# Patient Record
Sex: MALE | Race: BLACK OR AFRICAN AMERICAN | NOT HISPANIC OR LATINO | URBAN - METROPOLITAN AREA
[De-identification: names, ages, dates, MRNs, and addresses within clinical notes are randomized per-mention and may not be internally consistent; named-entity substitution may affect disease eponyms.]

---

## 2022-06-15 ENCOUNTER — EMERGENCY (EMERGENCY)
Facility: HOSPITAL | Age: 27
LOS: 1 days | Discharge: HOME | End: 2022-06-15
Admitting: EMERGENCY MEDICINE
Payer: SELF-PAY

## 2022-06-15 VITALS
DIASTOLIC BLOOD PRESSURE: 52 MMHG | SYSTOLIC BLOOD PRESSURE: 135 MMHG | HEART RATE: 82 BPM | TEMPERATURE: 98 F | RESPIRATION RATE: 16 BRPM | OXYGEN SATURATION: 99 % | WEIGHT: 229.28 LBS | HEIGHT: 72 IN

## 2022-06-15 DIAGNOSIS — R19.8 OTHER SPECIFIED SYMPTOMS AND SIGNS INVOLVING THE DIGESTIVE SYSTEM AND ABDOMEN: ICD-10-CM

## 2022-06-15 DIAGNOSIS — R09.81 NASAL CONGESTION: ICD-10-CM

## 2022-06-15 DIAGNOSIS — M79.672 PAIN IN LEFT FOOT: ICD-10-CM

## 2022-06-15 DIAGNOSIS — M79.671 PAIN IN RIGHT FOOT: ICD-10-CM

## 2022-06-15 LAB
FLUAV AG NPH QL: SIGNIFICANT CHANGE UP
FLUBV AG NPH QL: SIGNIFICANT CHANGE UP
RSV RNA NPH QL NAA+NON-PROBE: SIGNIFICANT CHANGE UP
SARS-COV-2 RNA SPEC QL NAA+PROBE: SIGNIFICANT CHANGE UP

## 2022-06-15 PROCEDURE — 99283 EMERGENCY DEPT VISIT LOW MDM: CPT | Mod: 25

## 2022-06-15 PROCEDURE — 87637 SARSCOV2&INF A&B&RSV AMP PRB: CPT

## 2022-06-15 PROCEDURE — 96372 THER/PROPH/DIAG INJ SC/IM: CPT

## 2022-06-15 PROCEDURE — 87491 CHLMYD TRACH DNA AMP PROBE: CPT

## 2022-06-15 PROCEDURE — 87591 N.GONORRHOEAE DNA AMP PROB: CPT

## 2022-06-15 PROCEDURE — 99284 EMERGENCY DEPT VISIT MOD MDM: CPT

## 2022-06-15 RX ORDER — AZITHROMYCIN 500 MG/1
1 TABLET, FILM COATED ORAL ONCE
Refills: 0 | Status: COMPLETED | OUTPATIENT
Start: 2022-06-15 | End: 2022-06-15

## 2022-06-15 RX ORDER — CEFTRIAXONE 500 MG/1
500 INJECTION, POWDER, FOR SOLUTION INTRAMUSCULAR; INTRAVENOUS ONCE
Refills: 0 | Status: COMPLETED | OUTPATIENT
Start: 2022-06-15 | End: 2022-06-15

## 2022-06-15 RX ADMIN — AZITHROMYCIN 1 GRAM(S): 500 TABLET, FILM COATED ORAL at 08:00

## 2022-06-15 RX ADMIN — CEFTRIAXONE 500 MILLIGRAM(S): 500 INJECTION, POWDER, FOR SOLUTION INTRAMUSCULAR; INTRAVENOUS at 08:00

## 2022-06-15 NOTE — ED PROVIDER NOTE - NSFOLLOWUPINSTRUCTIONS_ED_ALL_ED_FT
Follow up with your primary care doctor or clinics listed below if you do not have a doctor,    [address] [address]  To make an appointment, call [phone]    [facility]  Address: [address] [place] [id number]  Appointment Center: [phone] ([phone])     [facility] is a good referral line for crisis and substance abuse help.   has drop in programs all over the [facility].    Return to the ER for Emergencies.  Return immediately for any new or worsening symptoms or any new concerns    A sexually transmitted disease (STD) is a disease or infection that may be passed (transmitted) from person to person, usually during sexual activity. This may happen by way of saliva, semen, blood, vaginal mucus, or urine. Symptoms vary depending on the type of STD acquired and may include pain in the groin, discharge, and lesions or a rash. If you are started on an antibiotic, take it exactly as instructed. Avoid sexual contact of any kind until cleared by a health care professional. Contact your sexual partner(s) to inform them of your diagnosis so that they may be tested and treated as well.    SEEK IMMEDIATE MEDICAL CARE IF YOU HAVE ANY OF THE FOLLOWING SYMPTOMS: severe abdominal pain, high fever, nausea/vomiting, or unintended weight loss.

## 2022-06-15 NOTE — ED PROVIDER NOTE - PATIENT PORTAL LINK FT
You can access the FollowMyHealth Patient Portal offered by [facility] by registering at the following website: [email or web address]. By joining [employer]’s FollowMyHealth portal, you will also be able to view your health information using other applications (apps) compatible with our system.

## 2022-06-15 NOTE — ED PROVIDER NOTE - PHYSICAL EXAMINATION
Vitals reviewed  Gen: comfortable appearing, nad, speaking in full sentences- no hypoxia/dyspnea   Skin: wwp, no rash/lesions  HEENT: ncat, eomi, mmm  CV: rrr, no audible m/r/g  Resp: symmetrical expansion, ctab, no w/r/r  Abd: nondistended, soft/nt, no rebound/guarding, no cvat  GUALBERTO: (chaperone RN Kishor present)- no external mass/lesions, no discharge, no internal mass palpable, no blood or discharge noted   Ext: FROM throughout, no peripheral edema, distal pulses 2+  Neuro: alert/oriented, no focal deficits, steady gait

## 2022-06-15 NOTE — ED ADULT TRIAGE NOTE - CHIEF COMPLAINT QUOTE
Pt presents to ER with multiple complaints including, body aches, foot pain, back pain and rectal "discharge".

## 2022-06-15 NOTE — ED PROVIDER NOTE - CLINICAL SUMMARY MEDICAL DECISION MAKING FREE TEXT BOX
26 M undomiciled p/w mult c/o congestion/feeling run down, rectal discharge and pain in both feet.  on exam pt VSS, comfortable appearing, lungs ctab, hrrr, abd soft/nt, GUALBERTO no mass or lesions, no discharge or blood, ROSARIO.  pt requesting std testing and treatment although denies sexual activity and adamantly denies anal intercourse/MSM.  will obtain covid/flu swab, STD testing and treat w/ rocephin/azithro as concern for poor compliance w/ doxy.  no e/o rectal abscess or dc.  instructed to f/u outpt.  discussed strict return parameters

## 2022-06-15 NOTE — ED ADULT NURSE NOTE - BOWEL SOUNDS LLQ
Problem: At Risk for Falls  Goal: # Patient does not fall  Outcome: Outcome Met, Continue evaluating goal progress toward completion     Problem: At Risk for Injury Due to Fall  Goal: # Patient does not fall  Outcome: Outcome Met, Continue evaluating goal progress toward completion      present

## 2022-06-15 NOTE — ED PROVIDER NOTE - OBJECTIVE STATEMENT
26 M undomiciled p/w mult c/o congestion/feeling run down, rectal discharge and pain in both feet.  pt reports nasal congestion and feeling sick like he is getting a cold, no cough/fevers.  states he is fully vaccinated for covid.  also w/ rectal discharge which he describes as sweat like dc around buttock.  pt reports he is not sexually active but requesting std test/treatment.  adamantly denies anal intercourse.  also c/o b/l feet pain after walking around a lot.  denies f/c, headache, dizziness, fainting, chest pain, sob, abd pain, nvd, melena, hematochezia, urinary sxs, penile dc, testicular pain/swelling, trauma.

## 2022-06-16 LAB
C TRACH RRNA SPEC QL NAA+PROBE: SIGNIFICANT CHANGE UP
N GONORRHOEA RRNA SPEC QL NAA+PROBE: SIGNIFICANT CHANGE UP
SPECIMEN SOURCE: SIGNIFICANT CHANGE UP

## 2022-07-07 ENCOUNTER — EMERGENCY (EMERGENCY)
Facility: HOSPITAL | Age: 27
LOS: 1 days | Discharge: HOME | End: 2022-07-07
Admitting: EMERGENCY MEDICINE
Payer: MEDICAID

## 2022-07-07 VITALS
TEMPERATURE: 98 F | DIASTOLIC BLOOD PRESSURE: 64 MMHG | HEIGHT: 72 IN | HEART RATE: 88 BPM | RESPIRATION RATE: 16 BRPM | SYSTOLIC BLOOD PRESSURE: 118 MMHG | WEIGHT: 240.08 LBS | OXYGEN SATURATION: 97 %

## 2022-07-07 PROCEDURE — 99283 EMERGENCY DEPT VISIT LOW MDM: CPT

## 2022-07-07 RX ORDER — ACETAMINOPHEN 500 MG
975 TABLET ORAL ONCE
Refills: 0 | Status: COMPLETED | OUTPATIENT
Start: 2022-07-07 | End: 2022-07-07

## 2022-07-07 RX ADMIN — Medication 975 MILLIGRAM(S): at 14:07

## 2022-07-07 NOTE — ED PROVIDER NOTE - CLINICAL SUMMARY MEDICAL DECISION MAKING FREE TEXT BOX
25 yo m with no pmh c/o cough, ha, bodyaches x 6 days. Pt tested positive for covid on 7/1. Pt states he is homeless and has no place to go. No fever, chills, cp, sob, n/v/d. VSS. Exam unremarkable. 25 yo m with no pmh c/o cough, ha, bodyaches x 6 days. Pt tested positive for covid on 7/1. Pt states he is homeless and has no place to go. No fever, chills, cp, sob, n/v/d. VSS. Exam unremarkable. Seen by SW, pt past quarantine period so cannot get covid shelter.

## 2022-07-07 NOTE — ED ADULT NURSE NOTE - OBJECTIVE STATEMENT
Pt reports testing covid + on Friday 7/1, symptoms since Thursday. Reports headache,dry cough today, denies fever, chest pain, sob.

## 2022-07-07 NOTE — ED PROVIDER NOTE - PATIENT PORTAL LINK FT
You can access the FollowMyHealth Patient Portal offered by [facility] by registering at the following website: [email or web address]. By joining [employer]’s FollowMyHealth portal, you will also be able to view your health information using other applications (apps) compatible with our system.

## 2022-07-07 NOTE — ED ADULT TRIAGE NOTE - CHIEF COMPLAINT QUOTE
Pt reports testing covid + on Friday 7/1, symptoms since Thursday. Reports headache, cough today, denies fever, chest pain, sob.

## 2022-07-07 NOTE — ED PROVIDER NOTE - NSFOLLOWUPINSTRUCTIONS_ED_ALL_ED_FT
COVID-19    RETURN TO THE EMERGENCY DEPARTMENT FOR DIFFICULTY SPEAKING IN FULL SENTENCES, FEELING SHORT OF BREATH WALKING ROOM TO ROOM AT HOME OR UPSTAIRS, OR OTHER CONCERNING SYMPTOMS.    DO NOT LEAVE HOME. DO NOT TAKE PUBLIC TRANSPORTATION. IF YOU HAVE OTHERS IN YOUR HOME REMAIN 6-10 FEET FROM THEM AND USE THE MASK AT HOME. IF YOU MUST LEAVE THE HOUSE, USE THE MASK.     For isolation and quarantine of the general population, follow the CDC recommendations.   CDC Updated and Shortened Recommended Isolation and Quarantine Period for General Population are as follows:  •Isolate for 5 days, where day 0 is the day of symptom onset or (if asymptomatic) the day of collection of the first positive specimen.  •If asymptomatic at the end of 5 days or if symptoms are resolving, isolation ends and the individual should wear a well-fitting mask while around others for an additional 5 days.  •Individuals who are moderately-severely immunocompromised should continue to follow standard (i.e., not shortened) Isolation Guidance. Individuals who are unable to wear a well-fitting mask for 5 days after a 5-day isolation should also follow standard(i.e., not shortened) Isolation Guidance.    If exposed to COVID-19, quarantine as follows, where day 0 is the last date of exposure:   •If not fully vaccinated or fully vaccinated and eligible for a booster but not yet boosted, quarantine for 5 days and wear a well-fitting mask while around others for an additional 5 days.  •If fully vaccinated and booster (with the booster at least 2 weeks before the first date of exposure) or not yet eligible for a booster, no quarantine is required but these individuals should wear a well-fitting mask while around others for 10 days after the last date of exposure. If possible, test at day 5 with either a nucleic acid amplification test (NAAT, e.g., PCR) or antigen test.   •If symptoms appear, quarantine and seek testing. In this situation, quarantine would end when the test is negative. If testing is not done, isolate according to the guidance above.     Additional guidance from [facility] for schools is expected in coming days. [facility] will review that guidance when it becomes available. For the time being, schools should follow current [facility] school guidance regarding school attendance unless the local health department (LHD) issues alternative guidance. In the event the LHD issues alternative guidance, adhere to the LHD guidance for the school community.    Additional guidance from [facility] for congregate care settings is expected in coming days. [facility] will review that guidance when it becomes available. Many congregate care settings are at high risk for rapid transmission and/or poor outcomes. Congregate care settings include corrections, shelters, childcare, and group homes and other residential care settings not included in healthcare guidance. Congregate healthcare settings should follow [facility] guidance for healthcare personnel return.    Other congregate care settings should follow guidance issued by the D or, if none:   •In the absence of staff shortages, continue to follow standard (i.e., not shortened) guidance for [id number] for infected or exposed personnel for the setting.   •In staff shortage situations, as defined by an inability to provide essential services as determined by the entity, such congregate living settings may allow infected or exposed staff to return to work based on the durations for general population isolation and quarantine above.    Note that work restriction (if an individual works in health care or a congregate care setting as above) or school attendancer equirements (depending on [facility] guidance) for an individual might be different from isolation or quarantine requirements for that individual. Work restrictions pertain to only when the individual may return to work; isolation or quarantine requirements pertain to the individual’s other day-to-day activities in the community    As more information becomes available about appropriate isolation and quarantine durations with the Omicron variant, and as formal CDC guidance becomes available, [facility] will evaluate and update state guidance accordingly.    Check the CDC website (cdc.gov) for updates.    General information for spread of infection:   •Avoid close contact with people who are sick.  •Avoid touching your eyes, nose, and mouth.  •Stay home when you are sick.  •Cover your cough or sneeze with a tissue, then throw the tissue in the trash.  •Clean and disinfect frequently touched objects and surfaces using a regular household cleaning spray or wipe.  •Follow CDC’s recommendations for using a facemask.  •Wash your hands often with soap and water for at least 20 seconds, especially after going to the bathroom; before eating; and after blowing your nose, coughing, or sneezing.  •If soap and water are not readily available, use an alcohol-based hand  with at least 60% alcohol. Always wash hands with soap and water if hands are visibly dirty.

## 2022-07-07 NOTE — ED PROVIDER NOTE - OBJECTIVE STATEMENT
27 yo m with no pmh c/o cough, ha, bodyaches x 6 days. Pt tested positive for covid on 7/1. Pt states he is homeless and has no place to go. No fever, chills, cp, sob, n/v/d.

## 2022-07-07 NOTE — ED ADULT NURSE NOTE - CHPI ED NUR SYMPTOMS NEG
no chest pain/no chills/no cough/no diaphoresis/no edema/no fever/no hemoptysis/no shortness of breath/no wheezing

## 2022-07-09 ENCOUNTER — EMERGENCY (EMERGENCY)
Facility: HOSPITAL | Age: 27
LOS: 1 days | Discharge: HOME | End: 2022-07-09
Admitting: EMERGENCY MEDICINE
Payer: MEDICAID

## 2022-07-09 VITALS
RESPIRATION RATE: 16 BRPM | DIASTOLIC BLOOD PRESSURE: 75 MMHG | SYSTOLIC BLOOD PRESSURE: 113 MMHG | OXYGEN SATURATION: 100 % | TEMPERATURE: 98 F | HEART RATE: 69 BPM

## 2022-07-09 VITALS
DIASTOLIC BLOOD PRESSURE: 68 MMHG | RESPIRATION RATE: 18 BRPM | HEART RATE: 86 BPM | SYSTOLIC BLOOD PRESSURE: 125 MMHG | HEIGHT: 72 IN | TEMPERATURE: 98 F

## 2022-07-09 LAB — SARS-COV-2 RNA SPEC QL NAA+PROBE: POSITIVE

## 2022-07-09 PROCEDURE — 99283 EMERGENCY DEPT VISIT LOW MDM: CPT

## 2022-07-09 PROCEDURE — 99285 EMERGENCY DEPT VISIT HI MDM: CPT

## 2022-07-09 PROCEDURE — 87635 SARS-COV-2 COVID-19 AMP PRB: CPT

## 2022-07-09 RX ORDER — IBUPROFEN 200 MG
800 TABLET ORAL ONCE
Refills: 0 | Status: COMPLETED | OUTPATIENT
Start: 2022-07-09 | End: 2022-07-09

## 2022-07-09 RX ADMIN — Medication 800 MILLIGRAM(S): at 06:08

## 2022-07-09 NOTE — ED ADULT TRIAGE NOTE - ARRIVAL INFO ADDITIONAL COMMENTS
Pt Un-domiciled c/o Headache and "hot flashes". Afebrile. To ED today "Because I still feel sick". Had not taken any medications. Reports positive covid 19 test July 1st. To [facility] 2 days ago for same complaint Pt Un-domiciled c/o Headache and "hot flashes". Afebrile. To ED today "Because I still feel sick". Has not taken any medications. Reports positive covid 19 test July 1st. To [facility] 2 days ago for same complaint

## 2022-07-09 NOTE — ED ADULT NURSE NOTE - OBJECTIVE STATEMENT
pt c/o headache. pt was seen 2 days ago for same complaint. pt tested positive 2 days ago. ambulatory with steady gait. admits to smoking marijuana. foul smell noted from pt/ pt states that he is undomiciled.

## 2022-07-09 NOTE — ED PROVIDER NOTE - CLINICAL SUMMARY MEDICAL DECISION MAKING FREE TEXT BOX
26 to male in the ER with chills, body aches, HA, mild cough, nasal congestion. Pt states his symptoms started 3-4 days ago. Denies SOB, CP, abdominal pain, rash, recent travel or sick contact. Pt denies alcohol or illicit drug use.

## 2022-07-09 NOTE — ED PROVIDER NOTE - ATTESTATION, MLM
I have reviewed and confirmed nurses' notes for patient's medications, allergies, medical history, and surgical history. No deformity or limitation of movement

## 2022-07-09 NOTE — ED ADULT NURSE NOTE - NSIMPLEMENTINTERV_GEN_ALL_ED
Implemented All Universal Safety Interventions:  [place] to call system. Call bell, personal items and telephone within reach. Instruct patient to call for assistance. Room bathroom lighting operational. Non-slip footwear when patient is off stretcher. Physically safe environment: no spills, clutter or unnecessary equipment. Stretcher in lowest position, wheels locked, appropriate side rails in place.

## 2022-07-09 NOTE — ED PROVIDER NOTE - PATIENT PORTAL LINK FT
You can access the FollowMyHealth Patient Portal offered by [facility] by registering at the following website: [email or web address]. By joining [employer]’s FollowMyHealth portal, you will also be able to view your health information using other applications (apps) compatible with our system.

## 2022-07-09 NOTE — ED PROVIDER NOTE - NSFOLLOWUPINSTRUCTIONS_ED_ALL_ED_FT
Symptoms of COVID-19      Watch for symptoms    People with COVID-19 have had a wide range of symptoms reported – ranging from mild symptoms to severe illness. Symptoms may appear 2-14 days after exposure to the virus. Anyone can have mild to severe symptoms. People with these symptoms may have COVID-19:  •Fever or chills       •Cough       •Shortness of breath or difficulty breathing       •Fatigue       •Muscle or body aches       •Headache       •New loss of taste or smell       •Sore throat       •Congestion or runny nose       •Nausea or vomiting       •Diarrhea      This list does not include all possible symptoms. CDC will continue to update this list as we learn more about COVID-19. Older adults and people who have severe underlying medical conditions like heart or lung disease or diabetes seem to be at higher risk for developing more serious complications from COVID-19 illness.    Omicron Variant: Learn what you should know about Omicron and other COVID-19 variants.      Feeling sick?  • Coronavirus Self-   •A tool to help you make decisions on when to seek testing and medical care.        • If you are sick       • Testing for COVID-19       • Quarantine and isolation       • Caring for someone who is sick         When to seek emergency medical attention    Look for emergency warning signs* for COVID-19:  •Trouble breathing      •Persistent pain or pressure in the chest       •New confusion       •Inability to wake or stay awake       •Pale, gray, or blue-colored skin, lips, or nail beds, depending on skin tone      *This list is not all possible symptoms. Please call your medical provider for any other symptoms that are severe or concerning to you.     If someone is showing any of these signs, call 911 or call ahead to your local emergency facility: Notify the  that you are seeking care for someone who has or may have COVID-19.      Difference between flu and COVID-19    Influenza (Flu) and COVID-19 are both contagious respiratory illnesses, but they are caused by different viruses. COVID-19 is caused by infection with a coronavirus first identified in 2019, and flu is caused by infection with influenza viruses.    More about flu and COVID-19       More information    People at increased risk for severe illness     Healthcare workers: Information on COVID-19     03/22/2022    Content source: National Center for Immunization and Respiratory Diseases (NCIRD), Division of Viral Diseases    This information is not intended to replace advice given to you by your health care provider. Make sure you discuss any questions you have with your health care provider.

## 2022-07-10 ENCOUNTER — EMERGENCY (EMERGENCY)
Facility: HOSPITAL | Age: 27
LOS: 1 days | Discharge: LEFT BEFORE DISCHARGE | End: 2022-07-10
Admitting: EMERGENCY MEDICINE

## 2022-07-10 VITALS
TEMPERATURE: 98 F | HEART RATE: 78 BPM | DIASTOLIC BLOOD PRESSURE: 88 MMHG | RESPIRATION RATE: 18 BRPM | HEIGHT: 72 IN | OXYGEN SATURATION: 98 % | SYSTOLIC BLOOD PRESSURE: 137 MMHG

## 2022-07-10 PROCEDURE — L9991: CPT

## 2022-07-10 RX ORDER — ACETAMINOPHEN 500 MG
975 TABLET ORAL ONCE
Refills: 0 | Status: COMPLETED | OUTPATIENT
Start: 2022-07-10 | End: 2022-07-10

## 2022-07-10 RX ORDER — TETANUS TOXOID, REDUCED DIPHTHERIA TOXOID AND ACELLULAR PERTUSSIS VACCINE, ADSORBED 5; 2.5; 8; 8; 2.5 [IU]/.5ML; [IU]/.5ML; UG/.5ML; UG/.5ML; UG/.5ML
0.5 SUSPENSION INTRAMUSCULAR ONCE
Refills: 0 | Status: COMPLETED | OUTPATIENT
Start: 2022-07-10 | End: 2022-07-10

## 2022-07-10 RX ADMIN — TETANUS TOXOID, REDUCED DIPHTHERIA TOXOID AND ACELLULAR PERTUSSIS VACCINE, ADSORBED 0.5 MILLILITER(S): 5; 2.5; 8; 8; 2.5 SUSPENSION INTRAMUSCULAR at 03:11

## 2022-07-10 RX ADMIN — Medication 975 MILLIGRAM(S): at 03:10
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
## 2022-07-10 NOTE — ED ADULT NURSE NOTE - NSFALLRSKHARMRISK_ED_ALL_ED
Pt was called and discussed below information from PCP. Pt has no further concerns. She will have labs done on July and decide if she wants to see PCP sooner than 9/30   no
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
## 2022-07-11 DIAGNOSIS — Z59.00 HOMELESSNESS UNSPECIFIED: ICD-10-CM
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
## 2022-07-11 DIAGNOSIS — U07.1 COVID-19: ICD-10-CM

## 2022-07-11 DIAGNOSIS — R05.9 COUGH, UNSPECIFIED: ICD-10-CM

## 2022-07-11 DIAGNOSIS — Z23 ENCOUNTER FOR IMMUNIZATION: ICD-10-CM

## 2022-07-11 DIAGNOSIS — S61.412A LACERATION WITHOUT FOREIGN BODY OF LEFT HAND, INITIAL ENCOUNTER: ICD-10-CM

## 2022-07-11 DIAGNOSIS — Z53.21 PROCEDURE AND TREATMENT NOT CARRIED OUT DUE TO PATIENT LEAVING PRIOR TO BEING SEEN BY HEALTH CARE PROVIDER: ICD-10-CM

## 2022-07-11 DIAGNOSIS — R51.9 HEADACHE, UNSPECIFIED: ICD-10-CM

## 2022-07-11 SDOH — ECONOMIC STABILITY - HOUSING INSECURITY: HOMELESSNESS UNSPECIFIED: Z59.00

## 2022-10-31 ENCOUNTER — EMERGENCY (EMERGENCY)
Facility: HOSPITAL | Age: 27
LOS: 1 days | Discharge: HOME | End: 2022-10-31
Admitting: STUDENT IN AN ORGANIZED HEALTH CARE EDUCATION/TRAINING PROGRAM
Payer: SELF-PAY

## 2022-10-31 VITALS
SYSTOLIC BLOOD PRESSURE: 143 MMHG | RESPIRATION RATE: 17 BRPM | HEART RATE: 98 BPM | DIASTOLIC BLOOD PRESSURE: 79 MMHG | WEIGHT: 250 LBS | OXYGEN SATURATION: 98 % | TEMPERATURE: 99 F | HEIGHT: 72 IN

## 2022-10-31 LAB — SARS-COV-2 RNA SPEC QL NAA+PROBE: NEGATIVE — SIGNIFICANT CHANGE UP

## 2022-10-31 PROCEDURE — 99283 EMERGENCY DEPT VISIT LOW MDM: CPT | Mod: 25

## 2022-10-31 PROCEDURE — 73110 X-RAY EXAM OF WRIST: CPT | Mod: 26,LT

## 2022-10-31 PROCEDURE — 99284 EMERGENCY DEPT VISIT MOD MDM: CPT | Mod: 25

## 2022-10-31 PROCEDURE — 73130 X-RAY EXAM OF HAND: CPT | Mod: 26,LT

## 2022-10-31 PROCEDURE — 87635 SARS-COV-2 COVID-19 AMP PRB: CPT

## 2022-10-31 PROCEDURE — 73130 X-RAY EXAM OF HAND: CPT

## 2022-10-31 PROCEDURE — 73110 X-RAY EXAM OF WRIST: CPT

## 2022-10-31 NOTE — ED ADULT TRIAGE NOTE - ARRIVAL INFO ADDITIONAL COMMENTS
Patient observed with full active range of motion on left wrist during triage course - able to grasp objects and hold document clip board for triage information. Patient reports that he is homeless and has no place to go.

## 2022-10-31 NOTE — ED PROVIDER NOTE - CONDITION AT DISCHARGE:
From: Meg Roman  To: Tyesha Nye  Sent: 2/21/2022 9:44 AM CST  Subject: Hump    Dr. Weiss's office stated the following in a message Feb 9:    \"Hi Meg     Your insurance denied it was denied because of no swelling, lymphedema, or lipodema. Your ultrasound also does show any swelling.     Thanks,   Sandra RN\"    Do we have another option? It is stiff and still hurting. The last physical therapy was rather painful. I have not been to the last two visits because of traveling.     I will talk to the nurse today when I come in for my shot.     Meg  
Improved

## 2022-10-31 NOTE — ED PROVIDER NOTE - NSFOLLOWUPINSTRUCTIONS_ED_ALL_ED_FT
You were seen in the Emergency Department today for wrist pain.    Your XR was negative, there is no fracture. There is always a chance there is a small fracture missed on xray, if you continue to have pain be sure to follow up with an orthopedic doctor for continued evaluation.  This Is likely just soft tissue swelling and bruising. There may be tendon / muscle / ligament injury but that would be determined at a later day.     Rest the leg, avoid heavy lifting, strenuous activity.   Ice the area, 20 minutes 4 times a day   Use ace wrap / compression to help with swelling and pain.  Elevate the leg to decrease swelling and promote healing.   Take over the counter medications (tylneol / motirn) for pain. See below for dosing.     Follow up with Orthopedics within 1-2 weeks for further / continued evaluation. See office information listed here.     Return to the Emergency Department if you have any worsening or new symptoms such as inability to walk, numbness/tingling/paresthesias, severe swelling/redness, pain that cannot be controlled with over the counter medication, any chest pain or shortness of breath, or any other serious concerns.
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
## 2022-10-31 NOTE — ED PROVIDER NOTE - IV ALTEPLASE DOOR HIDDEN
Consent: The patient's consent was obtained including but not limited to risks of crusting, scabbing, blistering, scarring, darker or lighter pigmentary change, recurrence, incomplete removal and infection. Render Post-Care Instructions In Note?: no Duration Of Freeze Thaw-Cycle (Seconds): 10 Post-Care Instructions: I reviewed with the patient in detail post-care instructions. Patient is to wear sunprotection, and avoid picking at any of the treated lesions. Pt may apply Vaseline to crusted or scabbing areas. Medical Necessity Information: It is in your best interest to select a reason for this procedure from the list below. All of these items fulfill various CMS LCD requirements except the new and changing color options. Number Of Freeze-Thaw Cycles: 3 freeze-thaw cycles Medical Necessity Clause: This procedure was medically necessary because the lesions that were treated were: Detail Level: Detailed show
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
## 2022-10-31 NOTE — ED PROVIDER NOTE - PROGRESS NOTE DETAILS
xr w/o acute finding. ace wrap applied.  covid pending but pt requesting discharge. result can be followed up.   pt ok for dc at this time.     Discharge plan and return precautions d/w pt who verbalized understanding and agrees with plan. All questions answered. Vitals WNL. Ready for d/c.
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
## 2022-10-31 NOTE — ED PROVIDER NOTE - PHYSICAL EXAMINATION
Constitutional : Well appearing, non-toxic, no acute distress. awake, alert, oriented to person, place, time/situation.  Head : head normocephalic, atraumatic  EENMT : eyes clear bilaterally, PERRL, EOMI. airway patent. moist mucous membranes. neck supple.  Cardiac : Extremities warm and well perfused. 2+ radial and DP pulses. cap refill <2 seconds. no LE edema.  Resp : Respirations even and unlabored.   MSK :  left wrist - no obvious trauma, no ecchymosis or abrasions, FROM elbow/wrist/fingers, able to pronate/supinate without difficulty, forearm/wrist/hand/fingers nontender, no snuffbox tenderness, sensation 5/5 throughout, pulses 2+, cap refill < 2 seconds   range of motion is not limited, no muscle or joint tenderness  Back : No evidence of trauma. No spinal or CVA tenderness.  Neuro : Alert and oriented, CNII-XII grossly intact, no focal deficits, no motor or sensory deficits.  Skin : Skin normal color for race, warm, dry and intact. No evidence of rash.  Psych : Alert and oriented to person, place, time/situation. normal mood and affect. no apparent risk to self or others.
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
## 2022-10-31 NOTE — ED ADULT TRIAGE NOTE - BP NONINVASIVE DIASTOLIC (MM HG)
"Pt c/o bilateral leg and heel pain.  States \"Lynette had this diabetes for ever and its finally getting me\".    "
78

## 2022-10-31 NOTE — ED PROVIDER NOTE - PATIENT PORTAL LINK FT
You can access the FollowMyHealth Patient Portal offered by [facility] by registering at the following website: [email or web address]. By joining [email or web address]’s FollowMyHealth portal, you will also be able to view your health information using other applications (apps) compatible with our system.

## 2022-11-02 DIAGNOSIS — R09.81 NASAL CONGESTION: ICD-10-CM

## 2022-11-02 DIAGNOSIS — M25.532 PAIN IN LEFT WRIST: ICD-10-CM
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
## 2022-11-02 DIAGNOSIS — Z20.822 CONTACT WITH AND (SUSPECTED) EXPOSURE TO COVID-19: ICD-10-CM

## 2022-11-02 DIAGNOSIS — Z59.00 HOMELESSNESS UNSPECIFIED: ICD-10-CM

## 2022-11-02 DIAGNOSIS — R51.9 HEADACHE, UNSPECIFIED: ICD-10-CM

## 2022-11-02 SDOH — ECONOMIC STABILITY - HOUSING INSECURITY: HOMELESSNESS UNSPECIFIED: Z59.00

## 2022-11-27 ENCOUNTER — EMERGENCY (EMERGENCY)
Facility: HOSPITAL | Age: 27
LOS: 1 days | Discharge: HOME | End: 2022-11-27
Attending: EMERGENCY MEDICINE | Admitting: EMERGENCY MEDICINE

## 2022-11-27 VITALS
OXYGEN SATURATION: 96 % | RESPIRATION RATE: 18 BRPM | WEIGHT: 179.9 LBS | SYSTOLIC BLOOD PRESSURE: 116 MMHG | DIASTOLIC BLOOD PRESSURE: 70 MMHG | TEMPERATURE: 98 F | HEIGHT: 72 IN | HEART RATE: 79 BPM

## 2022-11-27 VITALS
HEART RATE: 74 BPM | RESPIRATION RATE: 18 BRPM | DIASTOLIC BLOOD PRESSURE: 80 MMHG | TEMPERATURE: 98 F | OXYGEN SATURATION: 98 % | SYSTOLIC BLOOD PRESSURE: 119 MMHG

## 2022-11-27 PROCEDURE — 73620 X-RAY EXAM OF FOOT: CPT | Mod: 26

## 2022-11-27 PROCEDURE — 73620 X-RAY EXAM OF FOOT: CPT | Mod: 26,50

## 2022-11-27 PROCEDURE — 99285 EMERGENCY DEPT VISIT HI MDM: CPT | Mod: 25

## 2022-11-27 PROCEDURE — 99053 MED SERV 10PM-8AM 24 HR FAC: CPT

## 2022-11-27 PROCEDURE — 70450 CT HEAD/BRAIN W/O DYE: CPT | Mod: 26

## 2022-11-27 RX ORDER — CEFTRIAXONE 500 MG/1
500 INJECTION, POWDER, FOR SOLUTION INTRAMUSCULAR; INTRAVENOUS ONCE
Refills: 0 | Status: COMPLETED | OUTPATIENT
Start: 2022-11-27 | End: 2022-11-27

## 2022-11-27 RX ORDER — PENICILLIN G BENZATHINE 1200000 [IU]/2ML
2.4 INJECTION, SUSPENSION INTRAMUSCULAR ONCE
Refills: 0 | Status: COMPLETED | OUTPATIENT
Start: 2022-11-27 | End: 2022-11-27

## 2022-11-27 RX ORDER — IBUPROFEN 200 MG
600 TABLET ORAL ONCE
Refills: 0 | Status: COMPLETED | OUTPATIENT
Start: 2022-11-27 | End: 2022-11-27

## 2022-11-27 RX ORDER — ACETAMINOPHEN 500 MG
650 TABLET ORAL ONCE
Refills: 0 | Status: COMPLETED | OUTPATIENT
Start: 2022-11-27 | End: 2022-11-27

## 2022-11-27 RX ORDER — AZITHROMYCIN 500 MG/1
1000 TABLET, FILM COATED ORAL ONCE
Refills: 0 | Status: COMPLETED | OUTPATIENT
Start: 2022-11-27 | End: 2022-11-27

## 2022-11-27 RX ADMIN — AZITHROMYCIN 1000 MILLIGRAM(S): 500 TABLET, FILM COATED ORAL at 05:22

## 2022-11-27 RX ADMIN — PENICILLIN G BENZATHINE 2.4 MILLION UNIT(S): 1200000 INJECTION, SUSPENSION INTRAMUSCULAR at 05:22

## 2022-11-27 RX ADMIN — CEFTRIAXONE 500 MILLIGRAM(S): 500 INJECTION, POWDER, FOR SOLUTION INTRAMUSCULAR; INTRAVENOUS at 05:22

## 2022-11-27 NOTE — ED PROVIDER NOTE - OBJECTIVE STATEMENT
26 yo male pt, undomiciled, no med hx. Presents after a fall, hit his head and has B/L foot pain. no abrasions, no lacs. sleeping and difficult to arouse. not very forthcoming w details about events tonight.

## 2022-11-27 NOTE — ED ADULT NURSE NOTE - NS ED NOTE ABUSE SUSPICION NEGLECT YN
What Type Of Note Output Would You Prefer (Optional)?: Bullet Format How Severe Are Your Spot(S)?: mild Have Your Spot(S) Been Treated In The Past?: has not been treated Hpi Title: Evaluation of Skin Lesions Additional History: Patient was a previous patient to [address] [place] [address] dermatology records are not available No

## 2022-11-27 NOTE — ED ADULT NURSE NOTE - OBJECTIVE STATEMENT
26 YO M Here for fall, A&OX4 ambulatory in NAD, c/o headache but reports he is tired, hasn't slept or ate.  offered tylenol and advil and pt stated he just wants to rest.  pt told dr he wants STD treatment, offered to pt then he refused the shots, took PO zithromax only.  xrays and CTs obtained, nasal fracture, pt referred to plastic surgery clinic.  cleared for DC.

## 2022-11-27 NOTE — ED PROVIDER NOTE - CARE PROVIDER_API CALL
Dave Couch)  Plastic Surgery; Surgery of the Hand  [address], [place] [address]  Phone: [phone]  Fax: [phone]  Follow Up Time:

## 2022-11-27 NOTE — ED PROVIDER NOTE - PATIENT PORTAL LINK FT
You can access the FollowMyHealth Patient Portal offered by [facility] by registering at the following website: [email or web address]. By joining [employer]’s FollowMyHealth portal, you will also be able to view your health information using other applications (apps) compatible with our system.

## 2022-11-27 NOTE — ED PROVIDER NOTE - CLINICAL SUMMARY MEDICAL DECISION MAKING FREE TEXT BOX
pt presents after a fall, seems somnolent and not very forthcoming with hx. Will get HCT and feet xrays.

## 2022-11-27 NOTE — ED ADULT TRIAGE NOTE - CHIEF COMPLAINT QUOTE
walk into ED states he tripped and fell approx 20-30 min PTA, endorses head injury and L wrist pain. denies LOC, anticoagulation. no external wounds noted. ambulatory with steady gait. states he has not slept in a few days.

## 2022-11-27 NOTE — ED PROVIDER NOTE - CHIEF COMPLAINT
In Patient Progress note Admit Date: 12/5/2018 Impression:  
 
1) oligoanuric BECK in setting of obst uropathy/harleen hydronephrosis d/t tumour burden Improving s/p PCN placement 2) hyperkalemia, resolved 3) metabolic acidoses,resolved 4) Harleen hydronephrosis , s/p harleen PCN 
5) Hypertension , better 6) H/o cervical & endometrial cancer 7) anemia ok 
  
Plan : 
1) d/c IVF and encourage po intake 2) BP goal < 140/80 , avoid extremes of BP 
3) strict I/o , daily wts. 4) follow labs today  
  
Please call with questions, 
  
Clarissa Bourgeois MD FASN Cell [phone] Pager: [phone] 
  
Subjective:  
 
Denies SOB Denies nausea Current Facility-Administered Medications:  
  metoprolol tartrate (LOPRESSOR) tablet 12.5 mg, 12.5 mg, Oral, BID, Jose Amador MD 
  sodium chloride (NS) flush 5-10 mL, 5-10 mL, IntraVENous, Q8H, Jairo Thompson MD, 10 mL at 12/10/18 2141   sodium chloride (NS) flush 5-10 mL, 5-10 mL, IntraVENous, PRN, Jairo Thompson MD 
  flumazenil (ROMAZICON) 0.1 mg/mL injection 0.2 mg, 0.2 mg, IntraVENous, MultipleJay Dmitri, MD 
  naloxone (NARCAN) injection 0.1 mg, 0.1 mg, IntraVENous, Multiple, Jairo Thompson MD 
  morphine 10 mg/ml injection 2 mg, 2 mg, IntraVENous, Q4H PRN, Catrachito Hinton MD 
  oxyCODONE-acetaminophen (PERCOCET) 5-325 mg per tablet 1-2 Tab, 1-2 Tab, Oral, Q6H PRN, Hernandez Estrada MD, 2 Tab at 12/11/18 0936 
  0.45% sodium chloride infusion, 100 mL/hr, IntraVENous, CONTINUOUS, Jose Amador MD, Last Rate: 100 mL/hr at 12/11/18 0412, 100 mL/hr at 12/11/18 1677   sodium chloride (NS) flush 5-10 mL, 5-10 mL, IntraVENous, Q8H, Gladys Vasquez MD, 10 mL at 12/10/18 2141   sodium chloride (NS) flush 5-10 mL, 5-10 mL, IntraVENous, PRN, Gladys Vasquez MD 
  acetaminophen (TYLENOL) tablet 650 mg, 650 mg, Oral, Q6H PRN, Dianelys Nathan MD, 650 mg at 12/06/18 7486   ondansetron (ZOFRAN) injection 4 mg, 4 mg, IntraVENous, Q6H PRN, Mavis Gilman MD, 4 mg at 12/11/18 0255   heparin (porcine) injection 5,000 Units, 5,000 Units, SubCUTAneous, Q8H, Mavis Gilman MD, 5,000 Units at 12/11/18 4063   hydrALAZINE (APRESOLINE) 20 mg/mL injection 10 mg, 10 mg, IntraVENous, Q6H PRN, Mavis Gilman MD 
 
 
 
Objective:  
 
Visit Vitals BP (!) 143/96 (BP 1 Location: Right arm, BP Patient Position: At rest) Pulse 94 Temp 97.6 °F (36.4 °C) Resp 20 Ht 5' 5\" (1.651 m) Wt 87 kg (191 lb 12.8 oz) SpO2 93% Breastfeeding? No  
BMI 31.92 kg/m² Intake/Output Summary (Last 24 hours) at 12/11/2018 1042 Last data filed at 12/11/2018 0330 Gross per 24 hour Intake 8329.7 ml Output 2000 ml Net 6329.7 ml Physical Exam:  
 
GEN NAD HENT mmm RS AEBE clear CVS s1 s2 wnl no JVD 
GI soft BS + Ext no edema Data Review: 
 
Recent Labs 12/10/18 
0134 WBC 13.7*  
RBC 3.46* HCT 31.0*  
MCV 89.6 MCH 28.0 MCHC 31.3  
RDW 14.2 Recent Labs 12/10/18 
0134 12/09/18 431 3995 12/09/18 32 61 16 12/09/18 
0900 12/09/18 
0304 12/08/18 
2040 12/08/18 
1400 BUN 24* 26* 26* 30* 32* 33* 36* CREA 2.45* 2.53* 2.69* 2.96* 3.40* 3.71* 4.27* CA 8.1* 8.0* 8.4* 8.2* 8.2* 8.2* 8.5  
K 3.8 4.0 4.4 4.3 4.6 4.2 4.1  136 138 138 139 139 138  104 105 105 106 107 105 CO2 23 25 25 24 24 23 26 * 96 101* 104* 112* 107* 129* Abdoulaye Dejesus MD 
 
 
 
 
 
 
 The patient is a 27y Male complaining of fall.

## 2022-11-28 DIAGNOSIS — Z20.822 CONTACT WITH AND (SUSPECTED) EXPOSURE TO COVID-19: ICD-10-CM

## 2022-11-28 DIAGNOSIS — M79.671 PAIN IN RIGHT FOOT: ICD-10-CM

## 2022-11-28 DIAGNOSIS — Z59.00 HOMELESSNESS UNSPECIFIED: ICD-10-CM

## 2022-11-28 DIAGNOSIS — S02.2XXA FRACTURE OF NASAL BONES, INITIAL ENCOUNTER FOR CLOSED FRACTURE: ICD-10-CM
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
## 2022-11-28 DIAGNOSIS — M79.672 PAIN IN LEFT FOOT: ICD-10-CM
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
## 2022-11-28 SDOH — ECONOMIC STABILITY - HOUSING INSECURITY: HOMELESSNESS UNSPECIFIED: Z59.00

## 2022-12-11 ENCOUNTER — EMERGENCY (EMERGENCY)
Facility: HOSPITAL | Age: 27
LOS: 1 days | Discharge: HOME | End: 2022-12-11
Attending: EMERGENCY MEDICINE
Payer: MEDICAID

## 2022-12-11 VITALS
HEART RATE: 85 BPM | OXYGEN SATURATION: 99 % | SYSTOLIC BLOOD PRESSURE: 127 MMHG | HEIGHT: 72 IN | DIASTOLIC BLOOD PRESSURE: 82 MMHG | RESPIRATION RATE: 22 BRPM | TEMPERATURE: 98 F | WEIGHT: 210.1 LBS

## 2022-12-11 VITALS
RESPIRATION RATE: 20 BRPM | OXYGEN SATURATION: 100 % | TEMPERATURE: 98 F | HEART RATE: 72 BPM | DIASTOLIC BLOOD PRESSURE: 86 MMHG | SYSTOLIC BLOOD PRESSURE: 125 MMHG

## 2022-12-11 LAB
ALBUMIN SERPL ELPH-MCNC: 4.2 G/DL — SIGNIFICANT CHANGE UP (ref 3.3–5)
ALP SERPL-CCNC: 82 U/L — SIGNIFICANT CHANGE UP (ref 40–120)
ALT FLD-CCNC: 28 U/L — SIGNIFICANT CHANGE UP (ref 10–45)
ANION GAP SERPL CALC-SCNC: 13 MMOL/L — SIGNIFICANT CHANGE UP (ref 5–17)
APTT BLD: 35.7 SEC — HIGH (ref 27.5–35.5)
AST SERPL-CCNC: 48 U/L — HIGH (ref 10–40)
BASOPHILS # BLD AUTO: 0.04 K/UL — SIGNIFICANT CHANGE UP (ref 0–0.2)
BASOPHILS NFR BLD AUTO: 0.7 % — SIGNIFICANT CHANGE UP (ref 0–2)
BILIRUB SERPL-MCNC: 0.5 MG/DL — SIGNIFICANT CHANGE UP (ref 0.2–1.2)
BUN SERPL-MCNC: 13 MG/DL — SIGNIFICANT CHANGE UP (ref 7–23)
CALCIUM SERPL-MCNC: 9.3 MG/DL — SIGNIFICANT CHANGE UP (ref 8.4–10.5)
CHLORIDE SERPL-SCNC: 103 MMOL/L — SIGNIFICANT CHANGE UP (ref 96–108)
CO2 SERPL-SCNC: 24 MMOL/L — SIGNIFICANT CHANGE UP (ref 22–31)
CREAT SERPL-MCNC: 1.11 MG/DL — SIGNIFICANT CHANGE UP (ref 0.5–1.3)
EGFR: 93 ML/MIN/1.73M2 — SIGNIFICANT CHANGE UP
EOSINOPHIL # BLD AUTO: 0.07 K/UL — SIGNIFICANT CHANGE UP (ref 0–0.5)
EOSINOPHIL NFR BLD AUTO: 1.3 % — SIGNIFICANT CHANGE UP (ref 0–6)
ETHANOL SERPL-MCNC: <10 MG/DL — SIGNIFICANT CHANGE UP (ref 0–10)
FLUAV AG NPH QL: SIGNIFICANT CHANGE UP
FLUBV AG NPH QL: SIGNIFICANT CHANGE UP
GLUCOSE SERPL-MCNC: 97 MG/DL — SIGNIFICANT CHANGE UP (ref 70–99)
HCT VFR BLD CALC: 41.4 % — SIGNIFICANT CHANGE UP (ref 39–50)
HGB BLD-MCNC: 13.4 G/DL — SIGNIFICANT CHANGE UP (ref 13–17)
IMM GRANULOCYTES NFR BLD AUTO: 0.2 % — SIGNIFICANT CHANGE UP (ref 0–0.9)
INR BLD: 1.03 RATIO — SIGNIFICANT CHANGE UP (ref 0.88–1.16)
LYMPHOCYTES # BLD AUTO: 2.43 K/UL — SIGNIFICANT CHANGE UP (ref 1–3.3)
LYMPHOCYTES # BLD AUTO: 43.5 % — SIGNIFICANT CHANGE UP (ref 13–44)
MAGNESIUM SERPL-MCNC: 1.9 MG/DL — SIGNIFICANT CHANGE UP (ref 1.6–2.6)
MCHC RBC-ENTMCNC: 30.1 PG — SIGNIFICANT CHANGE UP (ref 27–34)
MCHC RBC-ENTMCNC: 32.4 GM/DL — SIGNIFICANT CHANGE UP (ref 32–36)
MCV RBC AUTO: 93 FL — SIGNIFICANT CHANGE UP (ref 80–100)
MONOCYTES # BLD AUTO: 0.47 K/UL — SIGNIFICANT CHANGE UP (ref 0–0.9)
MONOCYTES NFR BLD AUTO: 8.4 % — SIGNIFICANT CHANGE UP (ref 2–14)
NEUTROPHILS # BLD AUTO: 2.57 K/UL — SIGNIFICANT CHANGE UP (ref 1.8–7.4)
NEUTROPHILS NFR BLD AUTO: 45.9 % — SIGNIFICANT CHANGE UP (ref 43–77)
NRBC # BLD: 0 /100 WBCS — SIGNIFICANT CHANGE UP (ref 0–0)
PLATELET # BLD AUTO: 201 K/UL — SIGNIFICANT CHANGE UP (ref 150–400)
POTASSIUM SERPL-MCNC: 4.4 MMOL/L — SIGNIFICANT CHANGE UP (ref 3.5–5.3)
POTASSIUM SERPL-SCNC: 4.4 MMOL/L — SIGNIFICANT CHANGE UP (ref 3.5–5.3)
PROT SERPL-MCNC: 7.5 G/DL — SIGNIFICANT CHANGE UP (ref 6–8.3)
PROTHROM AB SERPL-ACNC: 12 SEC — SIGNIFICANT CHANGE UP (ref 10.5–13.4)
RBC # BLD: 4.45 M/UL — SIGNIFICANT CHANGE UP (ref 4.2–5.8)
RBC # FLD: 12.4 % — SIGNIFICANT CHANGE UP (ref 10.3–14.5)
RSV RNA NPH QL NAA+NON-PROBE: SIGNIFICANT CHANGE UP
SARS-COV-2 RNA SPEC QL NAA+PROBE: SIGNIFICANT CHANGE UP
SODIUM SERPL-SCNC: 140 MMOL/L — SIGNIFICANT CHANGE UP (ref 135–145)
TROPONIN T, HIGH SENSITIVITY RESULT: 10 NG/L — SIGNIFICANT CHANGE UP (ref 0–51)
WBC # BLD: 5.59 K/UL — SIGNIFICANT CHANGE UP (ref 3.8–10.5)
WBC # FLD AUTO: 5.59 K/UL — SIGNIFICANT CHANGE UP (ref 3.8–10.5)

## 2022-12-11 PROCEDURE — 99285 EMERGENCY DEPT VISIT HI MDM: CPT

## 2022-12-11 PROCEDURE — 73090 X-RAY EXAM OF FOREARM: CPT

## 2022-12-11 PROCEDURE — 93005 ELECTROCARDIOGRAM TRACING: CPT

## 2022-12-11 PROCEDURE — 73090 X-RAY EXAM OF FOREARM: CPT | Mod: 26,LT

## 2022-12-11 PROCEDURE — 83735 ASSAY OF MAGNESIUM: CPT

## 2022-12-11 PROCEDURE — 99284 EMERGENCY DEPT VISIT MOD MDM: CPT

## 2022-12-11 PROCEDURE — 36415 COLL VENOUS BLD VENIPUNCTURE: CPT

## 2022-12-11 PROCEDURE — 84484 ASSAY OF TROPONIN QUANT: CPT

## 2022-12-11 PROCEDURE — 73110 X-RAY EXAM OF WRIST: CPT | Mod: 26,LT

## 2022-12-11 PROCEDURE — 87637 SARSCOV2&INF A&B&RSV AMP PRB: CPT

## 2022-12-11 PROCEDURE — 80053 COMPREHEN METABOLIC PANEL: CPT

## 2022-12-11 PROCEDURE — 70450 CT HEAD/BRAIN W/O DYE: CPT | Mod: 26,MA

## 2022-12-11 PROCEDURE — 73110 X-RAY EXAM OF WRIST: CPT

## 2022-12-11 PROCEDURE — 71045 X-RAY EXAM CHEST 1 VIEW: CPT | Mod: 26

## 2022-12-11 PROCEDURE — 85025 COMPLETE CBC W/AUTO DIFF WBC: CPT

## 2022-12-11 PROCEDURE — 80307 DRUG TEST PRSMV CHEM ANLYZR: CPT

## 2022-12-11 PROCEDURE — 70450 CT HEAD/BRAIN W/O DYE: CPT | Mod: MA

## 2022-12-11 PROCEDURE — 85730 THROMBOPLASTIN TIME PARTIAL: CPT

## 2022-12-11 PROCEDURE — 71045 X-RAY EXAM CHEST 1 VIEW: CPT

## 2022-12-11 PROCEDURE — 85610 PROTHROMBIN TIME: CPT

## 2022-12-11 NOTE — ED PROVIDER NOTE - CLINICAL SUMMARY MEDICAL DECISION MAKING FREE TEXT BOX
Roger PGY2: 28yo M with no home or support here after be blacked out, possible due to dec PO intake and cold exposure. No visible injury, able to mobilize L wrist. Will CTH to r/o intracranial injury, xray chest and wrist/foreharm for fx. Check basic labs for electrolyte abnormalities. WIll need SW before dispo.

## 2022-12-11 NOTE — ED PROVIDER NOTE - NSFOLLOWUPINSTRUCTIONS_ED_ALL_ED_FT
Fall    - CT scans did not show any fracture in your head  - Xrays did not show any fractures of your wrist, pain is likely bruising and strain from fall   - Please follow up with a primary care provider, if you do not have one you can follow up in our clinic    Rest, drink plenty of fluids.  Advance activity as tolerated.  Continue all previously prescribed medications as directed.  Follow up with your primary care physician in 48-72 hours- bring copies of your results.  Return to the ER for worsening or persistent symptoms, and/or ANY NEW OR CONCERNING SYMPTOMS.

## 2022-12-11 NOTE — ED ADULT NURSE NOTE - NSIMPLEMENTINTERV_GEN_ALL_ED
Implemented All Fall Risk Interventions:  [place] to call system. Call bell, personal items and telephone within reach. Instruct patient to call for assistance. Room bathroom lighting operational. Non-slip footwear when patient is off stretcher. Physically safe environment: no spills, clutter or unnecessary equipment. Stretcher in lowest position, wheels locked, appropriate side rails in place. Provide visual cue, wrist band, yellow gown, etc. Monitor gait and stability. Monitor for mental status changes and reorient to person, place, and time. Review medications for side effects contributing to fall risk. Reinforce activity limits and safety measures with patient and family.

## 2022-12-11 NOTE — ED PROVIDER NOTE - NSFOLLOWUPCLINICS_GEN_ALL_ED_FT
[facility] General Internal Medicine  General Internal Medicine  [address] [place] [address]  Phone: [phone]  Fax:     [facility]  Internal Medicine  [address] [place] [address]  Phone: [phone]  Fax: [phone]

## 2022-12-11 NOTE — ED ADULT NURSE NOTE - OBJECTIVE STATEMENT
28 yo male presents to ED by EMS c/o L wrist pain and head pain s/p "mechanical fall." Pt reports "for the past 7 days I haven't felt well, I haven't been able to eat or drink anything, today I started throwing up, then I passed out and fell on my wrist and hit my head, I don't remember what happened." Pt endorsing subjective fevers and nausea. Pt reports "can I please have something to eat." Pt denies vision changes, dizziness, cp, sob, abdominal pain, ETOH or substance use. Upon assessment, pt a&ox3 but poor historian/not answering all questions appropriately, pt unkempt, breathing spontaneous and unlabored, able to move all extremities and follow commands, skin warm dry and appropriate color. Pt safety measures in place and comfort provided. 28 yo male presents to ED by EMS c/o L wrist pain and head pain s/p "mechanical fall." Pt reports "for the past 7 days I haven't felt well, I haven't been able to eat or drink anything, today I started throwing up, then I passed out and fell on my wrist and hit my head, I don't remember what happened." Pt endorsing subjective fevers and nausea. Pt reports "can I please have something to eat." Pt denies vision changes, dizziness, cp, sob, abdominal pain, ETOH or substance use. Upon assessment, pt a&ox3 but poor historian/nonlinear, pt unkempt, breathing spontaneous and unlabored, able to move all extremities and follow commands, skin warm dry and appropriate color. Pt safety measures in place and comfort provided.

## 2022-12-11 NOTE — ED PROVIDER NOTE - ATTENDING CONTRIBUTION TO CARE
Buck Hussein MD:  I personally saw the patient and performed a substantive portion of the visit including all aspects of the medical decision making.    MDM: 19-year-old male who is undomiciled with no past medical history who presents with a syncopal episode and fall.  Patient states that he has been feeling unwell for the last week but for the last 3 days has had no access to shelter, food or water.  Patient reports that he passed out, fell himself on the ground, but denies any tongue bite or incontinence or confusion afterward.  Patient reporting his left wrist pain, worse with movement.  On examination patient is sleeping, but awakens to voice, is ANO x3, cardiac is RRR, lungs are clear to auscultation, chest and abdomen with no tenderness or signs of trauma.  Patient with no midline tenderness to C/T/L-spine.  On examination of the left wrist, patient with tenderness over the distal radius and ulna, but no tenderness over the snuffbox or scaphoid tubercle, and no pain with axial loading.  Neurovascular intact in all 4 extremities.  Patient with syncopal episode, will evaluate with EKG, troponin and telemetry to assess for cardiac pathology including arrhythmia.  Patient with no personal history or risk factors for ACS or family history of sudden cardiac death.  Will obtain labs to evaluate for hematologic disorder, metabolic derangements, hepatic and renal function.  Will obtain CT Head to evaluate for acute intracranial pathology.  Based on the NEXUS criteria, the patient does not warrant imaging for C-spine injury. The patient has no focal neurological deficit, midline spinal tenderness, altered level of consciousness, signs of intoxication, or distracting injury present. Intact nonfocal neuro exam with motor 5/5 in all 4 extremities, can range neck in all directions without pain.  We will obtain x-ray of injured extremity. Buck Hussein MD:  I personally saw the patient and performed a substantive portion of the visit including all aspects of the medical decision making.    MDM: 19-year-old male who is undomiciled with no past medical history who presents with a syncopal episode and fall.  Patient states that he has been feeling unwell for the last week but for the last 3 days has had no access to shelter, food or water.  Patient reports that he passed out, fell himself on the ground, but denies any tongue bite or incontinence or confusion afterward.  Patient reporting his left wrist pain, worse with movement.  On examination patient is sleeping, but awakens to voice, is ANO x3, cardiac is RRR, lungs are clear to auscultation, chest and abdomen with no tenderness or signs of trauma.  Patient with no midline tenderness to C/T/L-spine.  On examination of the left wrist, patient with tenderness over the distal radius and ulna, but no tenderness over the snuffbox or scaphoid tubercle, and no pain with axial loading.  Neurovascular intact in all 4 extremities.  Patient with syncopal episode, will evaluate with EKG, troponin and telemetry to assess for cardiac pathology including arrhythmia.  Patient with no personal history or risk factors for ACS or family history of sudden cardiac death.  Will obtain labs to evaluate for hematologic disorder, metabolic derangements, hepatic and renal function.  Will obtain CT Head to evaluate for acute intracranial pathology.  Based on the NEXUS criteria, the patient does not warrant imaging for C-spine injury. The patient has no focal neurological deficit, midline spinal tenderness, altered level of consciousness, signs of intoxication, or distracting injury present. Intact nonfocal neuro exam with motor 5/5 in all 4 extremities, can range neck in all directions without pain.  We will obtain x-ray of injured extremity.    Labs reviewed, nonactionable.  CT imaging showed no acute intracranial pathology, but suspected occipital scalp hematoma.  Imaging of the left wrist and forearm is negative.  Chest with clear lungs no acute displaced fractures, and no tenderness on examination of chest or abdomen.  Will consult with social work

## 2022-12-11 NOTE — ED PROVIDER NOTE - PHYSICAL EXAMINATION
CONSTITUTIONAL: tired appearing but well nourished, in no acute distress; mildly disheveled   SKIN: no visible bruises or injuries noted  HEAD: NCAT  EYES: NL inspection  ENT: MMM  NECK: Supple; non tender midline cervical spine  CARD: RRR  RESP: CTAB  ABD: S/NT no R/G  EXT: no visible injury or deformity; pt endorse pain In L wrist but lying on it in room and able to flex and move it; finger motions & elbow intact  NEURO: Grossly non-focal  PSYCH: Cooperative, appropriate.

## 2022-12-11 NOTE — CHART NOTE - NSCHARTNOTEFT_GEN_A_CORE
LMSW met with patient at bedside to inquire about transportation home. LMSW attempted to arrange Medicaid taxi but was advised that the patient is not eligible for this service. LMSW explained situation to patient. Patient reported that he will take the bus. Patient with no further questions/concerns at this time. SW will remain available.

## 2022-12-11 NOTE — ED ADULT NURSE REASSESSMENT NOTE - NS ED NURSE REASSESS COMMENT FT1
made aware that pt needs ride home. SW states she will speak to him.
JHON set up taxi for pt to come at [phone]. Pt made aware. Pt requested for food and ace bandage around right wrist. Ace bandage and food provided. Pt walked with RN  to waiting area.
Report received form RAQUEL Al. Pt A/O x4. Pt D/C by ED Resident Chance and RN provided d/c paper work. Pt provided breakfast tray and chicken salad sandwich as requested. Waiting for SW. Safety and comfort maintained.

## 2022-12-11 NOTE — ED PROVIDER NOTE - OBJECTIVE STATEMENT
28yo undomenciled M with no PMH presents to ED for eval after fall. Pt states he's been feeling 'unwell' for last week and for last 3d has had no access to shelter, food or water. States he was walking when he suddenly passed out, woke up on the ground an unknown amnt of time later. Hit head, but only endorsing pain in L wrist. Able to get up after and called EMS himself. Denies EtOH, drugs - just feels hungry and tired. No headache, CP, SOB, abd pain, NVDC.

## 2022-12-11 NOTE — ED PROVIDER NOTE - PATIENT PORTAL LINK FT
You can access the FollowMyHealth Patient Portal offered by [facility] by registering at the following website: [email or web address]. By joining [employer]’s FollowMyHealth portal, you will also be able to view your health information using other applications (apps) compatible with our system.

## 2022-12-25 ENCOUNTER — EMERGENCY (EMERGENCY)
Facility: HOSPITAL | Age: 27
LOS: 1 days | Discharge: HOME | End: 2022-12-25
Admitting: EMERGENCY MEDICINE
Payer: SELF-PAY

## 2022-12-25 VITALS
TEMPERATURE: 97 F | WEIGHT: 179.9 LBS | SYSTOLIC BLOOD PRESSURE: 130 MMHG | HEIGHT: 72 IN | HEART RATE: 91 BPM | DIASTOLIC BLOOD PRESSURE: 81 MMHG | OXYGEN SATURATION: 99 % | RESPIRATION RATE: 18 BRPM

## 2022-12-25 LAB
FLUAV AG NPH QL: SIGNIFICANT CHANGE UP
FLUBV AG NPH QL: SIGNIFICANT CHANGE UP
RSV RNA NPH QL NAA+NON-PROBE: DETECTED
SARS-COV-2 RNA SPEC QL NAA+PROBE: SIGNIFICANT CHANGE UP

## 2022-12-25 PROCEDURE — 71046 X-RAY EXAM CHEST 2 VIEWS: CPT | Mod: 26

## 2022-12-25 PROCEDURE — 71046 X-RAY EXAM CHEST 2 VIEWS: CPT

## 2022-12-25 PROCEDURE — 87637 SARSCOV2&INF A&B&RSV AMP PRB: CPT

## 2022-12-25 PROCEDURE — 99283 EMERGENCY DEPT VISIT LOW MDM: CPT | Mod: 25

## 2022-12-25 PROCEDURE — 99284 EMERGENCY DEPT VISIT MOD MDM: CPT | Mod: 25

## 2022-12-25 RX ORDER — ACETAMINOPHEN 500 MG
975 TABLET ORAL ONCE
Refills: 0 | Status: COMPLETED | OUTPATIENT
Start: 2022-12-25 | End: 2022-12-25

## 2022-12-25 RX ADMIN — Medication 975 MILLIGRAM(S): at 07:40

## 2022-12-25 NOTE — ED ADULT NURSE NOTE - OTHER COMPLAINTS
CC of non-productive cough x 1 week and haven't sleep for 3 days. denies any medical issues and no fever reported. no use of accessory muscles.

## 2022-12-25 NOTE — ED ADULT TRIAGE NOTE - OTHER COMPLAINTS
CC of non-productive cough x 1 week and haven't sleep for 3 days. denies any medical issues and no fever reported CC of non-productive cough x 1 week and haven't sleep for 3 days. denies any medical issues and no fever reported. no use of accessory muscles.

## 2022-12-25 NOTE — ED PROVIDER NOTE - OBJECTIVE STATEMENT
28 yo M with no pmh undomiciled c/o cough x 1 week and inability to sleep x 3 days. Associated with HA. Pt requesting food. Denies fever, chills, n/v, cp, sob, bodyaches, sore throat, rhinorrhea.

## 2022-12-25 NOTE — ED PROVIDER NOTE - PATIENT PORTAL LINK FT
You can access the FollowMyHealth Patient Portal offered by [facility] by registering at the following website: [email or web address]. By joining [employer]’s FollowMyHealth portal, you will also be able to view your health information using other applications (apps) compatible with our system.

## 2022-12-25 NOTE — ED PROVIDER NOTE - PHYSICAL EXAMINATION
CONSTITUTIONAL: pt sleeping   HEAD: Normocephalic; atraumatic.   EYES: PERRL; EOM intact; conjunctiva and sclera clear  ENT: normal nose; no rhinorrhea; normal pharynx with no erythema or lesions.   NECK: Supple; non-tender;   CARDIOVASCULAR: rrr,  RESPIRATORY: Breathing easily;   MSK: FROM at all extremities, normal tone   EXT: No cyanosis or edema; N/V intact  SKIN: Normal for age and race; warm; dry; good turgor; no apparent lesions or rash.

## 2022-12-25 NOTE — ED PROVIDER NOTE - CLINICAL SUMMARY MEDICAL DECISION MAKING FREE TEXT BOX
28 yo M with no pmh undomiciled c/o cough x 1 week and inability to sleep x 3 days. Associated with HA. Pt requesting food. Denies fever, chills, n/v, cp, sob, bodyaches, sore throat, rhinorrhea. VSS. Lungs cta b/l. 26 yo M with no pmh undomiciled c/o cough x 1 week and inability to sleep x 3 days. Associated with HA. Pt requesting food. Denies fever, chills, n/v, cp, sob, bodyaches, sore throat, rhinorrhea. VSS. Lungs cta b/l. CXR clear

## 2022-12-27 DIAGNOSIS — Z20.822 CONTACT WITH AND (SUSPECTED) EXPOSURE TO COVID-19: ICD-10-CM

## 2022-12-27 DIAGNOSIS — R05.9 COUGH, UNSPECIFIED: ICD-10-CM

## 2022-12-27 DIAGNOSIS — R51.9 HEADACHE, UNSPECIFIED: ICD-10-CM

## 2022-12-27 DIAGNOSIS — Z59.00 HOMELESSNESS UNSPECIFIED: ICD-10-CM

## 2022-12-27 SDOH — ECONOMIC STABILITY - HOUSING INSECURITY: HOMELESSNESS UNSPECIFIED: Z59.00

## 2023-01-17 ENCOUNTER — EMERGENCY (EMERGENCY)
Facility: HOSPITAL | Age: 28
LOS: 1 days | Discharge: HOME | End: 2023-01-17
Admitting: EMERGENCY MEDICINE
Payer: MEDICAID

## 2023-01-17 VITALS
DIASTOLIC BLOOD PRESSURE: 61 MMHG | RESPIRATION RATE: 18 BRPM | SYSTOLIC BLOOD PRESSURE: 112 MMHG | OXYGEN SATURATION: 98 % | TEMPERATURE: 98 F | HEIGHT: 72 IN | HEART RATE: 88 BPM

## 2023-01-17 PROCEDURE — 99053 MED SERV 10PM-8AM 24 HR FAC: CPT

## 2023-01-17 PROCEDURE — 99284 EMERGENCY DEPT VISIT MOD MDM: CPT

## 2023-01-17 RX ADMIN — Medication 500 MILLIGRAM(S): at 04:32

## 2023-01-17 NOTE — ED PROVIDER NOTE - NSFOLLOWUPINSTRUCTIONS_ED_ALL_ED_FT
Follow up with your primary care doctor or clinics listed below if you do not have a doctor,    [address] [address]  To make an appointment, call [phone]    [facility]  Address: [facility] [address] [place] [id number]  Appointment Center: [phone] ([phone])     [facility] is a good referral line for crisis and substance abuse help.  [facility] has drop in programs all over the city.    Return to the ER for Emergencies.  Return immediately for any new or worsening symptoms or any new concerns

## 2023-01-17 NOTE — ED PROVIDER NOTE - CLINICAL SUMMARY MEDICAL DECISION MAKING FREE TEXT BOX
medical screening exam has been performed.  Pt with no acute trauma or emergencies noted and exam wnl. NV intact, FROM, ambulatory with steady gait, no acute rash or wound noted, offered oral analgesics, socks, and medically stable for dc medical screening exam has been performed.  Pt with no acute trauma or emergencies noted and exam wnl. NV intact, FROM, ambulatory with steady gait, no acute rash, self inflicted wounds due to picking/peeling off dry skin, no superimposed bacterial infection noted, offered oral analgesics, socks, vaseline, and medically stable for dc for outpt f/u with podiatry

## 2023-01-17 NOTE — ED PROVIDER NOTE - PHYSICAL EXAMINATION
Gen - WDWN, NAD, speaking in full sentences  Skin - no acute rash, intact   HEENT - AT/NC, no conjunctival injection or dc, neck supple and FROM, airway patent   CV - S1S2, R/R/R  Resp - CTAB, no focal r/r/w   MSK - w/w/p, full ROM of peripheral joints, no midline tenderness   Neuro - AxOx3, ambulatory with steady gait Gen - Unkempt M, NAD, speaking in full sentences  Skin - no acute rash, chronic hyperkeratosis of the skin with self inflicted wound to the callous region, no active bleeding, fluctuance, warmth, crepitus or streaking    HEENT - AT/NC, no conjunctival injection or dc, neck supple and FROM, airway patent   CV - S1S2, R/R/R  Resp - CTAB, no focal r/r/w   MSK - w/w/p, full ROM of peripheral joints, no midline tenderness, +SILT, symmetric distal pulses, compartment soft   Neuro - AxOx3, ambulatory with steady gait

## 2023-01-17 NOTE — ED PROVIDER NOTE - OBJECTIVE STATEMENT
28 yo M with no known PMHx, undomiciled, presenting c/o b/l feet pain from prolonged walking. Reports pain after prolonged walking and cold exposure. Here asking for socks and pain meds. Denies fever, chills, trauma, fall, redness, swelling, rash, itching, palpitations, N/V, HA, dizziness, focal weakness, change in urinary/bowel function, and malaise 28 yo M with no known PMHx, undomiciled, presenting c/o b/l feet pain from prolonged walking. Reports pain after prolonged walking and cold exposure. Here asking for socks and pain meds. Reports dryness and has been peeling/picking on his skin due to the dryness and noted some bleeding of the region. Denies fever, chills, trauma, fall, redness, swelling, rash, itching, palpitations, N/V, HA, dizziness, focal weakness, change in urinary/bowel function, and malaise

## 2023-01-17 NOTE — ED PROVIDER NOTE - PATIENT PORTAL LINK FT
You can access the FollowMyHealth Patient Portal offered by [facility] by registering at the following website: [email or web address]. By joining [employer]’s FollowMyHealth portal, you will also be able to view your health information using other applications (apps) compatible with our system.

## 2023-01-17 NOTE — ED PROVIDER NOTE - NS ED ATTENDING NAME FT
Labs and CHF clinic note reviewed:  Vitals in CHF clinic: /79   Pulse 60    Current GDMT:  Entresto 97/103 mg BID  Toprol 50 mg daily (limited titration secondary to HR)  Spironolactone 25 mg daily  Farxiga 10 mg daily  Lasix 40 mg daily PRN    Continue current management    Thank you
Myrna Perdomo

## 2023-01-17 NOTE — ED ADULT NURSE NOTE - NSIMPLEMENTINTERV_GEN_ALL_ED
Implemented All Universal Safety Interventions:  [place] to call system. Call bell, personal items and telephone within reach. Instruct patient to call for assistance. Room bathroom lighting operational. Non-slip footwear when patient is off stretcher. Physically safe environment: no spills, clutter or unnecessary equipment. Stretcher in lowest position, wheels locked, appropriate side rails in place.

## 2023-01-17 NOTE — ED ADULT NURSE NOTE - CAS DISCH CONDITION
Cerebrovascular accident (CVA)    COPD (chronic obstructive pulmonary disease)    Dizziness    Former smoker    Gallstones    Hyperlipidemia    Hypertension    Stenosis of carotid artery  may 2017
Stable

## 2023-01-17 NOTE — ED PROVIDER NOTE - CARE PLAN
1 Principal Discharge DX:	Bilateral foot pain   Principal Discharge DX:	Bilateral foot pain  Secondary Diagnosis:	Foot callus

## 2023-01-19 DIAGNOSIS — M79.671 PAIN IN RIGHT FOOT: ICD-10-CM

## 2023-01-19 DIAGNOSIS — Z59.00 HOMELESSNESS UNSPECIFIED: ICD-10-CM

## 2023-01-19 DIAGNOSIS — M79.672 PAIN IN LEFT FOOT: ICD-10-CM

## 2023-01-19 DIAGNOSIS — L84 CORNS AND CALLOSITIES: ICD-10-CM

## 2023-01-19 SDOH — ECONOMIC STABILITY - HOUSING INSECURITY: HOMELESSNESS UNSPECIFIED: Z59.00

## 2023-03-06 NOTE — ED PROVIDER NOTE - IV ALTEPLASE DOOR HIDDEN
Patient saw Dr. Santiago at [facility] on 2/28 and he was requesting that we send him the genetic testing and PBG ALA testing.  After looking through her chart there was [facility] Gene Analysis,  The PBG test was not performed.   I did send Dr. Santiago and email as requested to [email or web address]. Patient has a 1 year follow up with Dr. Santiago   
show

## 2023-05-10 ENCOUNTER — EMERGENCY (EMERGENCY)
Facility: HOSPITAL | Age: 28
LOS: 1 days | Discharge: HOME | End: 2023-05-10
Admitting: EMERGENCY MEDICINE
Payer: MEDICAID

## 2023-05-10 VITALS
SYSTOLIC BLOOD PRESSURE: 116 MMHG | HEIGHT: 71 IN | DIASTOLIC BLOOD PRESSURE: 70 MMHG | TEMPERATURE: 97 F | OXYGEN SATURATION: 98 % | RESPIRATION RATE: 18 BRPM | WEIGHT: 199.96 LBS | HEART RATE: 77 BPM

## 2023-05-10 DIAGNOSIS — R09.81 NASAL CONGESTION: ICD-10-CM

## 2023-05-10 DIAGNOSIS — F17.200 NICOTINE DEPENDENCE, UNSPECIFIED, UNCOMPLICATED: ICD-10-CM

## 2023-05-10 DIAGNOSIS — Z28.310 UNVACCINATED FOR COVID-19: ICD-10-CM

## 2023-05-10 DIAGNOSIS — Z20.822 CONTACT WITH AND (SUSPECTED) EXPOSURE TO COVID-19: ICD-10-CM

## 2023-05-10 DIAGNOSIS — Z59.00 HOMELESSNESS UNSPECIFIED: ICD-10-CM

## 2023-05-10 DIAGNOSIS — J06.9 ACUTE UPPER RESPIRATORY INFECTION, UNSPECIFIED: ICD-10-CM

## 2023-05-10 LAB
FLUAV AG NPH QL: SIGNIFICANT CHANGE UP
FLUBV AG NPH QL: SIGNIFICANT CHANGE UP
HIV 1 & 2 AB SERPL IA.RAPID: SIGNIFICANT CHANGE UP
RSV RNA NPH QL NAA+NON-PROBE: SIGNIFICANT CHANGE UP
SARS-COV-2 RNA SPEC QL NAA+PROBE: SIGNIFICANT CHANGE UP

## 2023-05-10 PROCEDURE — 99284 EMERGENCY DEPT VISIT MOD MDM: CPT

## 2023-05-10 RX ORDER — LORATADINE 10 MG/1
10 TABLET ORAL ONCE
Refills: 0 | Status: COMPLETED | OUTPATIENT
Start: 2023-05-10 | End: 2023-05-10

## 2023-05-10 RX ADMIN — LORATADINE 10 MILLIGRAM(S): 10 TABLET ORAL at 04:54

## 2023-05-10 SDOH — ECONOMIC STABILITY - HOUSING INSECURITY: HOMELESSNESS UNSPECIFIED: Z59.00

## 2023-05-10 NOTE — ED PROVIDER NOTE - OBJECTIVE STATEMENT
26 yo M with no known PMHx, undomiciled, unvaccinated for covid, recently incarcerated, presenting c/o nasal congestion, rhinorrhea, cough and itchy eyes x 1 wk. Also desires to be tested for HIV, currently sexually active with single female partner. No active sx otherwise. Denies fever, chills, wheezing, hemoptysis, CP, palpitations, peripheral edema, stridors, focal weakness, tinnitus, HA, dizziness, N/V/D/C, abdominal pain, change in urinary/bowel function, night sweats, rash, and malaise. Also requesting food and socks in the ED

## 2023-05-10 NOTE — ED ADULT TRIAGE NOTE - CHIEF COMPLAINT QUOTE
Pt walked into ER c/o runny nose and cough for the past week after being released from [facility]. Pt also requesting STD check, Pt denies any STD complaints or exposure. Pt given food and socks at triage. Denies further at triage.

## 2023-05-10 NOTE — ED ADULT NURSE REASSESSMENT NOTE - NS ED NURSE REASSESS COMMENT FT1
Patient reevaluated by MD; cleared for discharge. Patient alert verbal oriented x3; ambulatory w/ steady gait. Left ED safely without complaint. Discharge paperwork provided. patient waiting in  for results of covid test and STD testing. calm cooperative

## 2023-05-10 NOTE — ED PROVIDER NOTE - CLINICAL SUMMARY MEDICAL DECISION MAKING FREE TEXT BOX
pt p/w sx suggestive of URI/allergy sx, well appearing and hemodynamically stable, s/p supportive care and given food and socks, viral swabs and rapid hiv screening obtained and sent, instructions and strict return precautions discussed, medically stable for dc, f/u with PMD/clinic, pt verbalized understanding

## 2023-05-10 NOTE — ED PROVIDER NOTE - PATIENT PORTAL LINK FT
You can access the FollowMyHealth Patient Portal offered by [facility] by registering at the following website: [email or web address]. By joining [employer]’s FollowMyHealth portal, you will also be able to view your health information using other applications (apps) compatible with our system.

## 2023-05-10 NOTE — ED ADULT NURSE NOTE - OBJECTIVE STATEMENT
28 y/o male here for eval of runny nose and cough. patient requesting for STD testing and covid testing. patient is alert verbal oriented x3 ambulatory with steady gait.

## 2023-05-10 NOTE — ED PROVIDER NOTE - NSFOLLOWUPINSTRUCTIONS_ED_ALL_ED_FT
Follow up with your primary care doctor or clinics listed below if you do not have a doctor,    [address] [address]  To make an appointment, call [phone]    [facility]  Address: [facility] [place] [address]  Appointment Center: [phone] ([phone])     [facility] [place] [facility] is a good referral line for crisis and substance abuse help.  [facility] has drop in programs all over the city.    Return to the ER for Emergencies.  Return immediately for any new or worsening symptoms or any new concerns

## 2023-05-10 NOTE — ED ADULT NURSE NOTE - CHIEF COMPLAINT QUOTE
Pt walked into ER c/o runny nose and cough for the past week after being [facility]. Pt also requesting STD check, Pt denies any STD complaints or exposure. Pt given food and socks at triage. Denies further at triage.

## 2023-05-10 NOTE — ED PROVIDER NOTE - PHYSICAL EXAMINATION
Gen - WDWN M, NAD, comfortable and non-toxic appearing  Skin - warm, dry, intact   HEENT - AT/NC, PERRL, EOMI, pupils 3mm b/l, clear conjunctiva, no nasal discharge, airway patent, neck supple and FROM  CV - S1S2, R/R/R  Resp - CTAB, no r/r/w  GI - soft, ND, NT, no CVAT b/l   MS - No acute or gross deformities noted to extremities. No midline spinal tenderness or step off on palpation  Neuro - AxOx3, ambulatory without gait disturbance

## 2023-05-10 NOTE — ED PROVIDER NOTE - CARE PROVIDER_API CALL
Naz Fernandez)  [address] [address]  Phone: [phone]  Fax: [phone]  Follow Up Time:

## 2023-06-14 NOTE — ED ADULT TRIAGE NOTE - SOURCE OF INFORMATION
Patient Render Risk Assessment In Note?: no Detail Level: Zone Additional Notes: Doesnât hurt patient.  Denies having it removed

## 2023-11-02 NOTE — ED ADULT TRIAGE NOTE - TEMPERATURE IN CELSIUS (DEGREES C)
Patient would like a referral to pulmonary medicine to help with sleep apnea. His wife sees the same doctor, in [place] [facility] Pulmonologist.  Fax: [phone]   36.6

## 2023-11-25 ENCOUNTER — EMERGENCY (EMERGENCY)
Facility: HOSPITAL | Age: 28
LOS: 1 days | Discharge: HOME | End: 2023-11-25
Attending: EMERGENCY MEDICINE | Admitting: EMERGENCY MEDICINE
Payer: MEDICAID

## 2023-11-25 VITALS
HEART RATE: 83 BPM | DIASTOLIC BLOOD PRESSURE: 78 MMHG | OXYGEN SATURATION: 100 % | RESPIRATION RATE: 18 BRPM | SYSTOLIC BLOOD PRESSURE: 124 MMHG | WEIGHT: 199.96 LBS | TEMPERATURE: 98 F | HEIGHT: 71 IN

## 2023-11-25 VITALS
OXYGEN SATURATION: 98 % | DIASTOLIC BLOOD PRESSURE: 73 MMHG | HEART RATE: 87 BPM | SYSTOLIC BLOOD PRESSURE: 124 MMHG | RESPIRATION RATE: 18 BRPM

## 2023-11-25 LAB
ALBUMIN SERPL ELPH-MCNC: 3.6 G/DL — SIGNIFICANT CHANGE UP (ref 3.3–5)
ALBUMIN SERPL ELPH-MCNC: 3.6 G/DL — SIGNIFICANT CHANGE UP (ref 3.3–5)
ALP SERPL-CCNC: 73 U/L — SIGNIFICANT CHANGE UP (ref 40–120)
ALP SERPL-CCNC: 73 U/L — SIGNIFICANT CHANGE UP (ref 40–120)
ALT FLD-CCNC: 60 U/L — HIGH (ref 10–45)
ALT FLD-CCNC: 60 U/L — HIGH (ref 10–45)
ANION GAP SERPL CALC-SCNC: 6 MMOL/L — SIGNIFICANT CHANGE UP (ref 5–17)
ANION GAP SERPL CALC-SCNC: 6 MMOL/L — SIGNIFICANT CHANGE UP (ref 5–17)
AST SERPL-CCNC: 51 U/L — HIGH (ref 10–40)
AST SERPL-CCNC: 51 U/L — HIGH (ref 10–40)
BASOPHILS # BLD AUTO: 0.04 K/UL — SIGNIFICANT CHANGE UP (ref 0–0.2)
BASOPHILS # BLD AUTO: 0.04 K/UL — SIGNIFICANT CHANGE UP (ref 0–0.2)
BASOPHILS NFR BLD AUTO: 0.6 % — SIGNIFICANT CHANGE UP (ref 0–2)
BASOPHILS NFR BLD AUTO: 0.6 % — SIGNIFICANT CHANGE UP (ref 0–2)
BILIRUB SERPL-MCNC: 0.2 MG/DL — SIGNIFICANT CHANGE UP (ref 0.2–1.2)
BILIRUB SERPL-MCNC: 0.2 MG/DL — SIGNIFICANT CHANGE UP (ref 0.2–1.2)
BUN SERPL-MCNC: 16 MG/DL — SIGNIFICANT CHANGE UP (ref 7–23)
BUN SERPL-MCNC: 16 MG/DL — SIGNIFICANT CHANGE UP (ref 7–23)
CALCIUM SERPL-MCNC: 9.1 MG/DL — SIGNIFICANT CHANGE UP (ref 8.4–10.5)
CALCIUM SERPL-MCNC: 9.1 MG/DL — SIGNIFICANT CHANGE UP (ref 8.4–10.5)
CHLORIDE SERPL-SCNC: 103 MMOL/L — SIGNIFICANT CHANGE UP (ref 96–108)
CHLORIDE SERPL-SCNC: 103 MMOL/L — SIGNIFICANT CHANGE UP (ref 96–108)
CO2 SERPL-SCNC: 31 MMOL/L — SIGNIFICANT CHANGE UP (ref 22–31)
CO2 SERPL-SCNC: 31 MMOL/L — SIGNIFICANT CHANGE UP (ref 22–31)
CREAT SERPL-MCNC: 1.04 MG/DL — SIGNIFICANT CHANGE UP (ref 0.5–1.3)
CREAT SERPL-MCNC: 1.04 MG/DL — SIGNIFICANT CHANGE UP (ref 0.5–1.3)
EGFR: 99 ML/MIN/1.73M2 — SIGNIFICANT CHANGE UP
EGFR: 99 ML/MIN/1.73M2 — SIGNIFICANT CHANGE UP
EOSINOPHIL # BLD AUTO: 0.12 K/UL — SIGNIFICANT CHANGE UP (ref 0–0.5)
EOSINOPHIL # BLD AUTO: 0.12 K/UL — SIGNIFICANT CHANGE UP (ref 0–0.5)
EOSINOPHIL NFR BLD AUTO: 1.7 % — SIGNIFICANT CHANGE UP (ref 0–6)
EOSINOPHIL NFR BLD AUTO: 1.7 % — SIGNIFICANT CHANGE UP (ref 0–6)
GLUCOSE SERPL-MCNC: 97 MG/DL — SIGNIFICANT CHANGE UP (ref 70–99)
GLUCOSE SERPL-MCNC: 97 MG/DL — SIGNIFICANT CHANGE UP (ref 70–99)
HCT VFR BLD CALC: 39.2 % — SIGNIFICANT CHANGE UP (ref 39–50)
HCT VFR BLD CALC: 39.2 % — SIGNIFICANT CHANGE UP (ref 39–50)
HGB BLD-MCNC: 12.5 G/DL — LOW (ref 13–17)
HGB BLD-MCNC: 12.5 G/DL — LOW (ref 13–17)
HIV 1 & 2 AB SERPL IA.RAPID: SIGNIFICANT CHANGE UP
HIV 1 & 2 AB SERPL IA.RAPID: SIGNIFICANT CHANGE UP
IMM GRANULOCYTES NFR BLD AUTO: 0.4 % — SIGNIFICANT CHANGE UP (ref 0–0.9)
IMM GRANULOCYTES NFR BLD AUTO: 0.4 % — SIGNIFICANT CHANGE UP (ref 0–0.9)
LYMPHOCYTES # BLD AUTO: 2.12 K/UL — SIGNIFICANT CHANGE UP (ref 1–3.3)
LYMPHOCYTES # BLD AUTO: 2.12 K/UL — SIGNIFICANT CHANGE UP (ref 1–3.3)
LYMPHOCYTES # BLD AUTO: 29.5 % — SIGNIFICANT CHANGE UP (ref 13–44)
LYMPHOCYTES # BLD AUTO: 29.5 % — SIGNIFICANT CHANGE UP (ref 13–44)
MCHC RBC-ENTMCNC: 31.2 PG — SIGNIFICANT CHANGE UP (ref 27–34)
MCHC RBC-ENTMCNC: 31.2 PG — SIGNIFICANT CHANGE UP (ref 27–34)
MCHC RBC-ENTMCNC: 31.9 GM/DL — LOW (ref 32–36)
MCHC RBC-ENTMCNC: 31.9 GM/DL — LOW (ref 32–36)
MCV RBC AUTO: 97.8 FL — SIGNIFICANT CHANGE UP (ref 80–100)
MCV RBC AUTO: 97.8 FL — SIGNIFICANT CHANGE UP (ref 80–100)
MONOCYTES # BLD AUTO: 0.55 K/UL — SIGNIFICANT CHANGE UP (ref 0–0.9)
MONOCYTES # BLD AUTO: 0.55 K/UL — SIGNIFICANT CHANGE UP (ref 0–0.9)
MONOCYTES NFR BLD AUTO: 7.7 % — SIGNIFICANT CHANGE UP (ref 2–14)
MONOCYTES NFR BLD AUTO: 7.7 % — SIGNIFICANT CHANGE UP (ref 2–14)
NEUTROPHILS # BLD AUTO: 4.32 K/UL — SIGNIFICANT CHANGE UP (ref 1.8–7.4)
NEUTROPHILS # BLD AUTO: 4.32 K/UL — SIGNIFICANT CHANGE UP (ref 1.8–7.4)
NEUTROPHILS NFR BLD AUTO: 60.1 % — SIGNIFICANT CHANGE UP (ref 43–77)
NEUTROPHILS NFR BLD AUTO: 60.1 % — SIGNIFICANT CHANGE UP (ref 43–77)
NRBC # BLD: 0 /100 WBCS — SIGNIFICANT CHANGE UP (ref 0–0)
NRBC # BLD: 0 /100 WBCS — SIGNIFICANT CHANGE UP (ref 0–0)
PLATELET # BLD AUTO: 244 K/UL — SIGNIFICANT CHANGE UP (ref 150–400)
PLATELET # BLD AUTO: 244 K/UL — SIGNIFICANT CHANGE UP (ref 150–400)
POTASSIUM SERPL-MCNC: 4.1 MMOL/L — SIGNIFICANT CHANGE UP (ref 3.5–5.3)
POTASSIUM SERPL-MCNC: 4.1 MMOL/L — SIGNIFICANT CHANGE UP (ref 3.5–5.3)
POTASSIUM SERPL-SCNC: 4.1 MMOL/L — SIGNIFICANT CHANGE UP (ref 3.5–5.3)
POTASSIUM SERPL-SCNC: 4.1 MMOL/L — SIGNIFICANT CHANGE UP (ref 3.5–5.3)
PROT SERPL-MCNC: 7.2 G/DL — SIGNIFICANT CHANGE UP (ref 6–8.3)
PROT SERPL-MCNC: 7.2 G/DL — SIGNIFICANT CHANGE UP (ref 6–8.3)
RBC # BLD: 4.01 M/UL — LOW (ref 4.2–5.8)
RBC # BLD: 4.01 M/UL — LOW (ref 4.2–5.8)
RBC # FLD: 13.5 % — SIGNIFICANT CHANGE UP (ref 10.3–14.5)
RBC # FLD: 13.5 % — SIGNIFICANT CHANGE UP (ref 10.3–14.5)
SODIUM SERPL-SCNC: 140 MMOL/L — SIGNIFICANT CHANGE UP (ref 135–145)
SODIUM SERPL-SCNC: 140 MMOL/L — SIGNIFICANT CHANGE UP (ref 135–145)
TROPONIN I, HIGH SENSITIVITY RESULT: 8.4 NG/L — SIGNIFICANT CHANGE UP
TROPONIN I, HIGH SENSITIVITY RESULT: 8.4 NG/L — SIGNIFICANT CHANGE UP
WBC # BLD: 7.18 K/UL — SIGNIFICANT CHANGE UP (ref 3.8–10.5)
WBC # BLD: 7.18 K/UL — SIGNIFICANT CHANGE UP (ref 3.8–10.5)
WBC # FLD AUTO: 7.18 K/UL — SIGNIFICANT CHANGE UP (ref 3.8–10.5)
WBC # FLD AUTO: 7.18 K/UL — SIGNIFICANT CHANGE UP (ref 3.8–10.5)

## 2023-11-25 PROCEDURE — 80053 COMPREHEN METABOLIC PANEL: CPT

## 2023-11-25 PROCEDURE — 93005 ELECTROCARDIOGRAM TRACING: CPT

## 2023-11-25 PROCEDURE — 71045 X-RAY EXAM CHEST 1 VIEW: CPT

## 2023-11-25 PROCEDURE — 99053 MED SERV 10PM-8AM 24 HR FAC: CPT

## 2023-11-25 PROCEDURE — 93010 ELECTROCARDIOGRAM REPORT: CPT

## 2023-11-25 PROCEDURE — 86703 HIV-1/HIV-2 1 RESULT ANTBDY: CPT

## 2023-11-25 PROCEDURE — 99285 EMERGENCY DEPT VISIT HI MDM: CPT | Mod: 25

## 2023-11-25 PROCEDURE — 99285 EMERGENCY DEPT VISIT HI MDM: CPT

## 2023-11-25 PROCEDURE — 85025 COMPLETE CBC W/AUTO DIFF WBC: CPT

## 2023-11-25 PROCEDURE — 84484 ASSAY OF TROPONIN QUANT: CPT

## 2023-11-25 PROCEDURE — 71045 X-RAY EXAM CHEST 1 VIEW: CPT | Mod: 26

## 2023-11-25 PROCEDURE — 36415 COLL VENOUS BLD VENIPUNCTURE: CPT

## 2023-11-25 RX ORDER — SODIUM CHLORIDE 9 MG/ML
1000 INJECTION INTRAMUSCULAR; INTRAVENOUS; SUBCUTANEOUS ONCE
Refills: 0 | Status: COMPLETED | OUTPATIENT
Start: 2023-11-25 | End: 2023-11-25

## 2023-11-25 RX ADMIN — SODIUM CHLORIDE 1000 MILLILITER(S): 9 INJECTION INTRAMUSCULAR; INTRAVENOUS; SUBCUTANEOUS at 04:34

## 2023-11-25 RX ADMIN — SODIUM CHLORIDE 1000 MILLILITER(S): 9 INJECTION INTRAMUSCULAR; INTRAVENOUS; SUBCUTANEOUS at 04:05

## 2023-11-25 NOTE — ED PROVIDER NOTE - PATIENT PORTAL LINK FT
You can access the FollowMyHealth Patient Portal offered by [facility] by registering at the following website: [email or web address]. By joining [employer]’s FollowMyHealth portal, you will also be able to view your health information using other applications (apps) compatible with our system.

## 2023-11-25 NOTE — ED PROVIDER NOTE - CLINICAL SUMMARY MEDICAL DECISION MAKING FREE TEXT BOX
28-year-old male with no past medical history brought in by ambulance after being found lying/resting on floor in parking lot in the cold tonight.  Patient states he is from [place] and he was here with his friend trying to get assistance from  today trying to get work.  He states he was not feeling well all day, has had some cough chest pain, feeling dizzy and weak and fainted at some point.  He denies any head strike.  No headache or neck pain.  No abdominal pain nausea vomiting or diarrhea.  No fever or chills.  Patient states he has not eaten for a few days, has had no appetite.  Patient states he is not homeless.  He denies any illegal drug use or alcohol use.  He denies any SI HI hallucinations    Patient well-appearing, no distress.  Vitals normal.  Exam unremarkable.  Patient noted to have poor hygiene, possibly homeless, though patient denies.  Patient with multitude of symptoms.  Will check labs, EKG troponin.  Rule out dehydration, electrolyte abnormality, ACS, arrhythmia. 28-year-old male with no past medical history brought in by ambulance after being found lying/resting on floor in parking lot in the cold tonight.  Patient states he is from [place] and he was here with his friend trying to get assistance from  today trying to get work.  He states he was not feeling well all day, has had some cough chest pain, feeling dizzy and weak and fainted at some point.  He denies any head strike.  No headache or neck pain.  No abdominal pain nausea vomiting or diarrhea.  No fever or chills.  Patient states he has not eaten for a few days, has had no appetite.  Patient states he is not homeless.  He denies any illegal drug use or alcohol use.  He denies any SI HI hallucinations    Patient well-appearing, no distress.  Vitals normal.  Exam unremarkable.  Patient noted to have poor hygiene, possibly homeless, though patient denies.  Patient with multitude of symptoms.  Will check labs, EKG troponin.  Rule out dehydration, electrolyte abnormality, ACS, arrhythmia.    Update: EKG completely normal.  Vital stable, normal.  Exam remains unchanged, neuro nonfocal.  Labs unremarkable.  Troponin normal.  Electrolytes glucose unremarkable.  Patient rested, drank and ate.  Follow-up PMD

## 2023-11-25 NOTE — ED PROVIDER NOTE - NSFOLLOWUPINSTRUCTIONS_ED_ALL_ED_FT
Follow Up in 1-3 Days with your own doctor or with  [address] [address]  Phone: [phone]    Chest Pain    WHAT YOU NEED TO KNOW:    Chest pain can be caused by a range of conditions, from not serious to life-threatening. Chest pain can be a symptom of a digestive problem, such as acid reflux or a stomach ulcer. An anxiety attack or a strong emotion, such as anger, can also cause chest pain. Infection, inflammation, or a fracture in the bones or cartilage in your chest can cause pain or discomfort. Sometimes chest pain or pressure is caused by poor blood flow to your heart (angina). Chest pain may also be caused by life-threatening conditions such as a heart attack or blood clot in your lungs.     DISCHARGE INSTRUCTIONS:    Call 911 if:   You have any of the following signs of a heart attack:   Squeezing, pressure, or pain in your chest      You may also have any of the following:   Discomfort or pain in your back, neck, jaw, stomach, or arm    Shortness of breath    Nausea or vomiting    Lightheadedness or a sudden cold sweat    Return to the emergency department if:     You have chest discomfort that gets worse, even with medicine.    You cough or vomit blood.     Your bowel movements are black or bloody.     You cannot stop vomiting, or it hurts to swallow.     Contact your healthcare provider if:     You have questions or concerns about your condition or care.        Medicines:     Medicines may be given to treat the cause of your chest pain. Examples include pain medicine, anxiety medicine, or medicines to increase blood flow to your heart.       Do not take certain medicines without asking your healthcare provider first. These include NSAIDs, herbal or vitamin supplements, or hormones (estrogen or progestin).       Take your medicine as directed. Contact your healthcare provider if you think your medicine is not helping or if you have side effects. Tell him or her if you are allergic to any medicine. Keep a list of the medicines, vitamins, and herbs you take. Include the amounts, and when and why you take them. Bring the list or the pill bottles to follow-up visits. Carry your medicine list with you in case of an emergency.    Follow up with your healthcare provider within 72 hours, or as directed: You may need to return for more tests to find the cause of your chest pain. You may be referred to a specialist, such as a cardiologist or gastroenterologist. Write down your questions so you remember to ask them during your visits.     Healthy living tips: The following are general healthy guidelines. If your chest pain is caused by a heart problem, your healthcare provider will give you specific guidelines to follow.    Do not smoke. Nicotine and other chemicals in cigarettes and cigars can cause lung and heart damage. Ask your healthcare provider for information if you currently smoke and need help to quit. E-cigarettes or smokeless tobacco still contain nicotine. Talk to your healthcare provider before you use these products.       Eat a variety of healthy, low-fat, low-salt foods. Healthy foods include fruits, vegetables, whole-grain breads, low-fat dairy products, beans, lean meats, and fish. Ask for more information about a heart healthy diet.    Drink plenty of water every day. Your body is made of mostly water. Water helps your body to control your temperature and blood pressure. Ask your healthcare provider how much water you should drink every day.    Ask about activity. Your healthcare provider will tell you which activities to limit or avoid. Ask when you can drive, return to work, and have sex. Ask about the best exercise plan for you.    Maintain a healthy weight. Ask your healthcare provider how much you should weigh. Ask him or her to help you create a weight loss plan if you are overweight.     Get the flu and pneumonia vaccines. All adults should get the influenza (flu) vaccine. Get it every year as soon as it becomes available. The pneumococcal vaccine is given to adults aged 65 years or older. The vaccine is given every 5 years to prevent pneumococcal disease, such as pneumonia.    If you have a stent:   Carry your stent card with you at all times.   Let all healthcare providers know that you have a stent.    Syncope    WHAT YOU NEED TO KNOW:    Syncope is also called fainting or passing out. Syncope is a sudden, temporary loss of consciousness, followed by a fall from a standing or sitting position. Syncope ranges from not serious to a sign of a more serious condition that needs to be treated. You can control some health conditions that cause syncope. Your healthcare providers can help you create a plan to manage syncope and prevent episodes.    DISCHARGE INSTRUCTIONS:    Seek care immediately if:     You are bleeding because you hit your head when you fainted.     You suddenly have double vision, difficulty speaking, numbness, and cannot move your arms or legs.    You have chest pain and trouble breathing.    You vomit blood or material that looks like coffee grounds.      You see blood in your bowel movement.    Contact your healthcare provider if:     You have new or worsening symptoms.    You have another syncope episode.    You have a headache, fast heartbeat, or feel too dizzy to stand up.    You have questions or concerns about your condition or care.    Medicines:     Medicines may be needed to help your heart pump strongly and regularly. Your healthcare provider may also make changes to any medicines that are causing syncope.     Take your medicine as directed. Contact your healthcare provider if you think your medicine is not helping or if you have side effects. Tell him or her if you are allergic to any medicine. Keep a list of the medicines, vitamins, and herbs you take. Include the amounts, and when and why you take them. Bring the list or the pill bottles to follow-up visits. Carry your medicine list with you in case of an emergency.    Follow up with your healthcare provider as directed: Write down your questions so you remember to ask them during your visits.     Manage syncope:     Keep a record of your syncope episodes. Include your symptoms and your activity before and after the episode. The record can help your healthcare provider find the cause of your syncope and help you manage episodes.    Sit or lie down when needed. This includes when you feel dizzy, your throat is getting tight, and your vision changes. Raise your legs above the level of your heart.    Take slow, deep breaths if you stat to breathe faster with anxiety or fear. This can help decrease dizziness and the feeling that you might faint.     Check your blood pressure often. This is important if you take medicine to lower your blood pressure. Check your blood pressure when you are lying down and when you are standing. Ask how often to check during the day. Keep a record of your blood pressure numbers. Your healthcare provider may use the record to help plan your treatment.How to take a Blood Pressure         Prevent a syncope episode:     Move slowly and let yourself get used to one position before you move to another position. This is very important when you change from a lying or sitting position to a standing position. Take some deep breaths before you stand up from a lying position. Stand up slowly. Sudden movements may cause a fainting spell. Sit on the side of the bed or couch for a few minutes before you stand up. If you are on bedrest, try to be upright for about 2 hours each day, or as directed. Do not lock your legs if you are standing for a long period of time. Move your legs and bend your knees to keep blood flowing.    Follow your healthcare provider's recommendations. Your provider may recommend that you drink more liquids to prevent dehydration. You may also need to have more salt to keep your blood pressure from dropping too low and causing syncope. Your provider will tell you how much liquid and sodium to have each day. He or she will also tell you how much physical activity is safe for you. This will depend on what is causing your syncope.    Watch for signs of low blood sugar. These include hunger, nervousness, sweating, and fast or fluttery heartbeats. Talk with your healthcare provider about ways to keep your blood sugar level steady.    Do not strain if you are constipated. You may faint if you strain to have a bowel movement. Walking is the best way to get your bowels moving. Eat foods high in fiber to make it easier to have a bowel movement. Good examples are high-fiber cereals, beans, vegetables, and whole-grain breads. Prune juice may help make bowel movements softer.    Be careful in hot weather. Heat can cause a syncope episode. Limit activity done outside on hot days. Physical activity in hot weather can lead to dehydration. This can cause an episode.

## 2023-11-25 NOTE — ED PROVIDER NOTE - PHYSICAL EXAMINATION
exam:   General: well appearing, NAD.   HEENT: eyes perrl, nose normal, OP no erythema/exudate/swelling. head without swelling/tenderness/ discoloration or other signs of trauma  cor: RRR, s1s2, 2+rad pulses.   lungs: ctabl, no resp distress.   abd: soft, ntnd.   neuro: a&ox3, cn2-12 intact, ROSARIO, 5/5 strength c nl sensation all extremities, nl coordination.   MSK: no extremity swelling.  Skin: normal, no rash  psych: no si/hi/hallucinations. coherent
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
## 2023-11-25 NOTE — ED PROVIDER NOTE - OBJECTIVE STATEMENT
28-year-old male with no past medical history brought in by ambulance after being found lying/resting on floor in parking lot in the cold tonight.  Patient states he is from [place] and he was here with his friend trying to get assistance from  today trying to get work.  He states he was not feeling well all day, has had some cough chest pain, feeling dizzy and weak and fainted at some point.  He denies any head strike.  No headache or neck pain.  No abdominal pain nausea vomiting or diarrhea.  No fever or chills.  Patient states he has not eaten for a few days, has had no appetite.  Patient states he is not homeless.  He denies any illegal drug use or alcohol use.  He denies any SI HI hallucinations
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
## 2024-01-04 ENCOUNTER — EMERGENCY (EMERGENCY)
Facility: HOSPITAL | Age: 29
LOS: 1 days | Discharge: HOME | End: 2024-01-04
Attending: EMERGENCY MEDICINE | Admitting: EMERGENCY MEDICINE
Payer: MEDICAID

## 2024-01-04 VITALS
DIASTOLIC BLOOD PRESSURE: 61 MMHG | TEMPERATURE: 98 F | SYSTOLIC BLOOD PRESSURE: 119 MMHG | HEART RATE: 66 BPM | RESPIRATION RATE: 18 BRPM | OXYGEN SATURATION: 97 %

## 2024-01-04 DIAGNOSIS — M79.671 PAIN IN RIGHT FOOT: ICD-10-CM

## 2024-01-04 DIAGNOSIS — M79.672 PAIN IN LEFT FOOT: ICD-10-CM

## 2024-01-04 PROCEDURE — 99283 EMERGENCY DEPT VISIT LOW MDM: CPT

## 2024-01-04 NOTE — ED ADULT NURSE NOTE - NSFALLUNIVINTERV_ED_ALL_ED
Bed/Stretcher in lowest position, wheels locked, appropriate side rails in place/Call bell, personal items and telephone in reach/Instruct patient to call for assistance before getting out of bed/chair/stretcher/Non-slip footwear applied when patient is off stretcher/[place] to call system/Physically safe environment - no spills, clutter or unnecessary equipment/Purposeful proactive rounding/Room/bathroom lighting operational, light cord in reach Bed/Stretcher in lowest position, wheels locked, appropriate side rails in place/Call bell, personal items and telephone in reach/Instruct patient to call for assistance before getting out of bed/chair/stretcher/Non-slip footwear applied when patient is off stretcher/[place] to call system/Physically safe environment - no spills, clutter or unnecessary equipment/Purposeful proactive rounding/Room/bathroom lighting operational, light cord in reach
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
## 2024-01-04 NOTE — ED PROVIDER NOTE - OBJECTIVE STATEMENT
28-year-old male per patient no past medical history who presents with bilateral feet pain.  Patient notes he has been on his feet and is requesting something to eat and somewhere to rest.  Patient denies any other acute medical complaints denies recent trauma

## 2024-01-04 NOTE — ED PROVIDER NOTE - PATIENT PORTAL LINK FT
You can access the FollowMyHealth Patient Portal offered by [facility] by registering at the following website: [email or web address]. By joining [employer]’s FollowMyHealth portal, you will also be able to view your health information using other applications (apps) compatible with our system. You can access the FollowMyHealth Patient Portal offered by [facility] by registering at the following website: [email or web address]. By joining [employer]’s FollowMyHealth portal, you will also be able to view your health information using other applications (apps) compatible with our system.

## 2024-01-04 NOTE — ED PROVIDER NOTE - CLINICAL SUMMARY MEDICAL DECISION MAKING FREE TEXT BOX
Patient with no past medical history presents with bilateral feet pain.  His exam is unremarkable will be given new socks to be given food and likely discharge this morning.

## 2024-01-04 NOTE — ED ADULT NURSE NOTE - OBJECTIVE STATEMENT
29y/o male c/c of bilateral feet pain for unknown time. No evidence of injuries or swelling.  Patient requesting food and shelter.  List of shelter and sandwich provided. 27y/o male c/c of bilateral feet pain for unknown time. No evidence of injuries or swelling.  Patient requesting food and shelter.  List of shelter and sandwich provided.

## 2024-01-15 ENCOUNTER — EMERGENCY (EMERGENCY)
Facility: HOSPITAL | Age: 29
LOS: 1 days | Discharge: HOME | End: 2024-01-15
Admitting: EMERGENCY MEDICINE
Payer: MEDICAID

## 2024-01-15 VITALS
SYSTOLIC BLOOD PRESSURE: 122 MMHG | OXYGEN SATURATION: 97 % | RESPIRATION RATE: 16 BRPM | HEART RATE: 83 BPM | TEMPERATURE: 99 F | DIASTOLIC BLOOD PRESSURE: 72 MMHG

## 2024-01-15 DIAGNOSIS — J02.9 ACUTE PHARYNGITIS, UNSPECIFIED: ICD-10-CM

## 2024-01-15 DIAGNOSIS — B34.9 VIRAL INFECTION, UNSPECIFIED: ICD-10-CM

## 2024-01-15 DIAGNOSIS — Z59.00 HOMELESSNESS UNSPECIFIED: ICD-10-CM

## 2024-01-15 PROCEDURE — 99283 EMERGENCY DEPT VISIT LOW MDM: CPT

## 2024-01-15 SDOH — ECONOMIC STABILITY - HOUSING INSECURITY: HOMELESSNESS UNSPECIFIED: Z59.00

## 2024-01-16 NOTE — ED PROVIDER NOTE - CARE PROVIDERS DIRECT ADDRESSES
,[email or web address]

## 2024-01-16 NOTE — ED PROVIDER NOTE - PHYSICAL EXAMINATION
Gen - Unkempt M, NAD, speaking in full sentences, no drooling or trismus   Skin - no acute rash, intact   HEENT - AT/NC, no conjunctival injection or dc, o/p clear with no erythema.edema/fluctuance/exudate, no pooling of saliva, neck supple and FROM, airway patent   CV - S1S2, R/R/R  Resp - CTAB, no focal r/r/w   MSK - w/w/p, full ROM of peripheral joints, no midline tenderness   Psych - euphoria, normal speech and eye contact, judgement/insight intact   Neuro - AxOx3, ambulatory with steady gait

## 2024-01-16 NOTE — ED PROVIDER NOTE - CARE PROVIDER_API CALL
Dominik Stuart  Otolaryngology  [address]  [place] [address]  Phone: [phone]  Fax: [phone]  Follow Up Time:    Dominik Stuart  Otolaryngology  [address]  [place] [address]  Phone: [phone]  Fax: [phone]  Follow Up Time:

## 2024-01-16 NOTE — ED PROVIDER NOTE - PATIENT PORTAL LINK FT
You can access the FollowMyHealth Patient Portal offered by [facility] by registering at the following website: [email or web address]. By joining [employer]’s FollowMyHealth portal, you will also be able to view your health information using other applications (apps) compatible with our system. You can access the FollowMyHealth Patient Portal offered by [facility] by registering at the following website: [email or web address]. By joining [employer]’s FollowMyHealth portal, you will also be able to view your health information using other applications (apps) compatible with our system.

## 2024-01-16 NOTE — ED PROVIDER NOTE - CLINICAL SUMMARY MEDICAL DECISION MAKING FREE TEXT BOX
pt p/w sx suggestive of URI sx x few hours, no change in phonation or systemic sx, well appearing and hemodynamically stable and tolerating po, s/p supportive care with improvement, not suspecting acute bacterial infection. AFVSS at time of dc, medically stable for dc, f/u with PMD/clinic, given food and shelter list, pt verbalized understanding

## 2024-01-27 NOTE — ED PROVIDER NOTE - NSCAREINITIATED _GEN_ER
Alicia Sung(PA) (0) lying quietly, normal position, moves easily/(0) content, relaxed/(0) no cry (awake or asleep)/(0) no particular expression or smile/(0) normal position or relaxed

## 2024-02-19 NOTE — ED PROVIDER NOTE - CARE PROVIDERS DIRECT ADDRESSES
Unable to contact patient for PO call due to no numbers listed in chart.  Patient does have a PO appt scheduled.   [email or web address]

## 2024-05-18 ENCOUNTER — EMERGENCY (EMERGENCY)
Facility: HOSPITAL | Age: 29
LOS: 1 days | Discharge: HOME | End: 2024-05-18
Attending: EMERGENCY MEDICINE | Admitting: EMERGENCY MEDICINE
Payer: MEDICAID

## 2024-05-18 VITALS
OXYGEN SATURATION: 96 % | TEMPERATURE: 98 F | SYSTOLIC BLOOD PRESSURE: 117 MMHG | HEIGHT: 72 IN | WEIGHT: 199.96 LBS | DIASTOLIC BLOOD PRESSURE: 76 MMHG | RESPIRATION RATE: 18 BRPM | HEART RATE: 102 BPM

## 2024-05-18 VITALS
DIASTOLIC BLOOD PRESSURE: 74 MMHG | RESPIRATION RATE: 20 BRPM | SYSTOLIC BLOOD PRESSURE: 112 MMHG | OXYGEN SATURATION: 100 % | HEART RATE: 97 BPM

## 2024-05-18 PROBLEM — Z59.00 HOMELESSNESS UNSPECIFIED: Chronic | Status: ACTIVE | Noted: 2022-12-11

## 2024-05-18 LAB
ALBUMIN SERPL ELPH-MCNC: 4.8 G/DL — SIGNIFICANT CHANGE UP (ref 3.3–5)
ALP SERPL-CCNC: 104 U/L — SIGNIFICANT CHANGE UP (ref 40–120)
ALT FLD-CCNC: 26 U/L — SIGNIFICANT CHANGE UP (ref 10–45)
ANION GAP SERPL CALC-SCNC: 14 MMOL/L — SIGNIFICANT CHANGE UP (ref 5–17)
AST SERPL-CCNC: SIGNIFICANT CHANGE UP U/L (ref 10–40)
BASOPHILS # BLD AUTO: 0.04 K/UL — SIGNIFICANT CHANGE UP (ref 0–0.2)
BASOPHILS NFR BLD AUTO: 0.4 % — SIGNIFICANT CHANGE UP (ref 0–2)
BILIRUB SERPL-MCNC: 0.4 MG/DL — SIGNIFICANT CHANGE UP (ref 0.2–1.2)
BUN SERPL-MCNC: 23 MG/DL — SIGNIFICANT CHANGE UP (ref 7–23)
CALCIUM SERPL-MCNC: 10.1 MG/DL — SIGNIFICANT CHANGE UP (ref 8.4–10.5)
CHLORIDE SERPL-SCNC: 102 MMOL/L — SIGNIFICANT CHANGE UP (ref 96–108)
CO2 SERPL-SCNC: 22 MMOL/L — SIGNIFICANT CHANGE UP (ref 22–31)
CREAT SERPL-MCNC: 1.5 MG/DL — HIGH (ref 0.5–1.3)
EGFR: 64 ML/MIN/1.73M2 — SIGNIFICANT CHANGE UP
EOSINOPHIL # BLD AUTO: 0 K/UL — SIGNIFICANT CHANGE UP (ref 0–0.5)
EOSINOPHIL NFR BLD AUTO: 0 % — SIGNIFICANT CHANGE UP (ref 0–6)
ETHANOL SERPL-MCNC: <10 MG/DL — SIGNIFICANT CHANGE UP (ref 0–10)
GLUCOSE SERPL-MCNC: 128 MG/DL — HIGH (ref 70–99)
HCT VFR BLD CALC: 39 % — SIGNIFICANT CHANGE UP (ref 39–50)
HGB BLD-MCNC: 12.9 G/DL — LOW (ref 13–17)
IMM GRANULOCYTES NFR BLD AUTO: 0.2 % — SIGNIFICANT CHANGE UP (ref 0–0.9)
LIDOCAIN IGE QN: 14 U/L — SIGNIFICANT CHANGE UP (ref 7–60)
LYMPHOCYTES # BLD AUTO: 1.38 K/UL — SIGNIFICANT CHANGE UP (ref 1–3.3)
LYMPHOCYTES # BLD AUTO: 13.8 % — SIGNIFICANT CHANGE UP (ref 13–44)
MAGNESIUM SERPL-MCNC: 1.9 MG/DL — SIGNIFICANT CHANGE UP (ref 1.6–2.6)
MCHC RBC-ENTMCNC: 30.8 PG — SIGNIFICANT CHANGE UP (ref 27–34)
MCHC RBC-ENTMCNC: 33.1 GM/DL — SIGNIFICANT CHANGE UP (ref 32–36)
MCV RBC AUTO: 93.1 FL — SIGNIFICANT CHANGE UP (ref 80–100)
MONOCYTES # BLD AUTO: 0.72 K/UL — SIGNIFICANT CHANGE UP (ref 0–0.9)
MONOCYTES NFR BLD AUTO: 7.2 % — SIGNIFICANT CHANGE UP (ref 2–14)
NEUTROPHILS # BLD AUTO: 7.87 K/UL — HIGH (ref 1.8–7.4)
NEUTROPHILS NFR BLD AUTO: 78.4 % — HIGH (ref 43–77)
NRBC # BLD: 0 /100 WBCS — SIGNIFICANT CHANGE UP (ref 0–0)
PLATELET # BLD AUTO: 230 K/UL — SIGNIFICANT CHANGE UP (ref 150–400)
POTASSIUM SERPL-MCNC: 4.2 MMOL/L — SIGNIFICANT CHANGE UP (ref 3.5–5.3)
POTASSIUM SERPL-SCNC: 4.2 MMOL/L — SIGNIFICANT CHANGE UP (ref 3.5–5.3)
PROT SERPL-MCNC: 8.8 G/DL — HIGH (ref 6–8.3)
RBC # BLD: 4.19 M/UL — LOW (ref 4.2–5.8)
RBC # FLD: 13.3 % — SIGNIFICANT CHANGE UP (ref 10.3–14.5)
SODIUM SERPL-SCNC: 138 MMOL/L — SIGNIFICANT CHANGE UP (ref 135–145)
WBC # BLD: 10.03 K/UL — SIGNIFICANT CHANGE UP (ref 3.8–10.5)
WBC # FLD AUTO: 10.03 K/UL — SIGNIFICANT CHANGE UP (ref 3.8–10.5)

## 2024-05-18 PROCEDURE — 70450 CT HEAD/BRAIN W/O DYE: CPT | Mod: 26,MC

## 2024-05-18 PROCEDURE — 99285 EMERGENCY DEPT VISIT HI MDM: CPT | Mod: 25

## 2024-05-18 PROCEDURE — 80307 DRUG TEST PRSMV CHEM ANLYZR: CPT

## 2024-05-18 PROCEDURE — 83735 ASSAY OF MAGNESIUM: CPT

## 2024-05-18 PROCEDURE — 70450 CT HEAD/BRAIN W/O DYE: CPT | Mod: MC

## 2024-05-18 PROCEDURE — 96374 THER/PROPH/DIAG INJ IV PUSH: CPT

## 2024-05-18 PROCEDURE — 74177 CT ABD & PELVIS W/CONTRAST: CPT | Mod: 26,MC

## 2024-05-18 PROCEDURE — 83690 ASSAY OF LIPASE: CPT

## 2024-05-18 PROCEDURE — 99285 EMERGENCY DEPT VISIT HI MDM: CPT

## 2024-05-18 PROCEDURE — 80053 COMPREHEN METABOLIC PANEL: CPT

## 2024-05-18 PROCEDURE — 74177 CT ABD & PELVIS W/CONTRAST: CPT | Mod: MC

## 2024-05-18 PROCEDURE — 85025 COMPLETE CBC W/AUTO DIFF WBC: CPT

## 2024-05-18 PROCEDURE — 82962 GLUCOSE BLOOD TEST: CPT

## 2024-05-18 PROCEDURE — 36415 COLL VENOUS BLD VENIPUNCTURE: CPT

## 2024-05-18 PROCEDURE — 93010 ELECTROCARDIOGRAM REPORT: CPT

## 2024-05-18 PROCEDURE — 93005 ELECTROCARDIOGRAM TRACING: CPT

## 2024-05-18 RX ORDER — ONDANSETRON 8 MG/1
1 TABLET, FILM COATED ORAL
Qty: 1 | Refills: 0
Start: 2024-05-18

## 2024-05-18 RX ORDER — ONDANSETRON 8 MG/1
4 TABLET, FILM COATED ORAL ONCE
Refills: 0 | Status: COMPLETED | OUTPATIENT
Start: 2024-05-18 | End: 2024-05-18

## 2024-05-18 RX ORDER — SODIUM CHLORIDE 9 MG/ML
1000 INJECTION INTRAMUSCULAR; INTRAVENOUS; SUBCUTANEOUS ONCE
Refills: 0 | Status: COMPLETED | OUTPATIENT
Start: 2024-05-18 | End: 2024-05-18

## 2024-05-18 RX ADMIN — SODIUM CHLORIDE 1000 MILLILITER(S): 9 INJECTION INTRAMUSCULAR; INTRAVENOUS; SUBCUTANEOUS at 02:06

## 2024-05-18 RX ADMIN — ONDANSETRON 4 MILLIGRAM(S): 8 TABLET, FILM COATED ORAL at 02:06

## 2024-05-18 NOTE — ED PROVIDER NOTE - NSFOLLOWUPINSTRUCTIONS_ED_ALL_ED_FT
Acute Nausea and Vomiting    WHAT YOU NEED TO KNOW:    Acute means the nausea and vomiting starts suddenly, gets worse quickly, and lasts a short time. There are many possible causes of acute nausea and vomiting.    DISCHARGE INSTRUCTIONS:    Call your local emergency number (911 in the [place]) if:    You have chest pain.    You have severe pain or cramping in your abdomen.    Your vision is blurred.    You are confused, have a high fever, or a stiff neck.    You have bright red blood coming from your rectum.    Your vomit smells like bowel movement.  Return to the emergency department if:    You have a severe headache or pain.    You are dizzy, cold, and thirsty, and your eyes and mouth are dry.    You are urinating very little or not at all.    You are dizzy or lightheaded when you stand up.    You see blood or material that looks like coffee grounds in your vomit.  Call your doctor if:    You continue to vomit for more than 48 hours.    Your nausea and vomiting does not get better or go away after you use medicine.    You have questions or concerns about your condition or care.  Medicines: You may need any of the following:    Medicines may be given to calm your stomach and stop your vomiting. You may also need medicines to help empty your stomach and bowels.    Take your medicine as directed. Contact your healthcare provider if you think your medicine is not helping or if you have side effects. Tell your provider if you are allergic to any medicine. Keep a list of the medicines, vitamins, and herbs you take. Include the amounts, and when and why you take them. Bring the list or the pill bottles to follow-up visits. Carry your medicine list with you in case of an emergency.  Manage your symptoms:    Rest as much as you can. Too much activity can make your nausea worse.    Drink more liquids to prevent dehydration. Take small sips. Try drinks such as ginger ale, lemonade, water, or tea. Your provider may recommend that you drink an oral rehydration solution (ORS). ORS contains water, salts, and sugar that are needed to replace the lost body fluids.    Eat smaller meals, more often. Try bland foods and avoid spices or strong flavors    Do not drink alcohol. Alcohol may upset or irritate your stomach.  Follow up with your doctor as directed: Write down your questions so you remember to ask them during your visits.

## 2024-05-18 NOTE — ED PROVIDER NOTE - PROGRESS NOTE DETAILS
no further vomiting, tolerating water. abd soft, nontender, no distention. pt would like to be discharged.  I have discussed the discharge plan with the patient. The patient agrees with the plan, as discussed.  The patient understands Emergency Department diagnosis is a preliminary diagnosis often based on limited information and that the patient must adhere to the follow-up plan as discussed.  The patient understands that if the symptoms worsen the patient may return to the Emergency Department at any time for further evaluation and treatment.

## 2024-05-18 NOTE — ED PROVIDER NOTE - CLINICAL SUMMARY MEDICAL DECISION MAKING FREE TEXT BOX
n/v, abd soft, afebrile, possible viral syndrome  -check labs  -ivf, zofran  -CT head r/o ich  -CT a/p r/o abd path

## 2024-05-18 NOTE — ED PROVIDER NOTE - OBJECTIVE STATEMENT
28M no PMH BIBEMS for n/v. initially pt refused to change into gown and refused iv placement by RN. pt requesting juice.  pt came back from bathroom and suddenly started vomited in stretcher. pt not answering questions, no providing hx.

## 2024-05-18 NOTE — ED ADULT NURSE NOTE - NSFALLUNIVINTERV_ED_ALL_ED
Bed/Stretcher in lowest position, wheels locked, appropriate side rails in place/Call bell, personal items and telephone in reach/Instruct patient to call for assistance before getting out of bed/chair/stretcher/Non-slip footwear applied when patient is off stretcher/[place] to call system/Physically safe environment - no spills, clutter or unnecessary equipment/Purposeful proactive rounding/Room/bathroom lighting operational, light cord in reach

## 2024-05-18 NOTE — ED PROVIDER NOTE - PATIENT PORTAL LINK FT
You can access the FollowMyHealth Patient Portal offered by [facility] by registering at the following website: [email or web address]. By joining [facility]’s FollowMyHealth portal, you will also be able to view your health information using other applications (apps) compatible with our system.

## 2024-05-20 DIAGNOSIS — R11.2 NAUSEA WITH VOMITING, UNSPECIFIED: ICD-10-CM

## 2024-06-06 NOTE — ED ADULT NURSE NOTE - COVID-19 RESULT DATE/TIME
Patient called stating she had lab tests and xray done yesterday and is unsure if she should still come in for her appointment today 330.  Patient is requesting a call back    Please call back to advise   09-Jul-2022 06:08 no

## 2025-03-26 NOTE — ED ADULT TRIAGE NOTE - PRO INTERPRETER NEED 2
Bed in lowest position, wheels locked, appropriate side rails in place/Call bell, personal items and telephone in reach/Instruct patient to call for assistance before getting out of bed or chair/Non-slip footwear when patient is out of bed/[place] to call system/Physically safe environment - no spills, clutter or unnecessary equipment/Purposeful Proactive Rounding/Room/bathroom lighting operational, light cord in reach English

## 2025-04-10 NOTE — ED PROVIDER NOTE - TEMPLATE, MLM
Patient has been added to the Medication Management and Talk Therapy wait list with a referral.    There is no custody agreement in place, patients mother advised she has sole custody.     Writer emailed outside resources/consent forms to email address on file.     Insurance: [employer]  Insurance Type:    Commercial []   Medicaid [x]   [place] []  Location Preference: Any  Provider Preference: Any  Virtual: Yes [x] No []  Were outside resources sent: Yes [x] No []       Abdominal Pain, N/V/D

## 2025-05-14 ENCOUNTER — EMERGENCY (EMERGENCY)
Facility: HOSPITAL | Age: 30
LOS: 1 days | End: 2025-05-14
Admitting: EMERGENCY MEDICINE
Payer: SELF-PAY

## 2025-05-14 PROCEDURE — L9992: CPT

## 2025-05-15 DIAGNOSIS — Z53.21 PROCEDURE AND TREATMENT NOT CARRIED OUT DUE TO PATIENT LEAVING PRIOR TO BEING SEEN BY HEALTH CARE PROVIDER: ICD-10-CM
